# Patient Record
Sex: MALE
[De-identification: names, ages, dates, MRNs, and addresses within clinical notes are randomized per-mention and may not be internally consistent; named-entity substitution may affect disease eponyms.]

---

## 2017-04-06 ENCOUNTER — HOSPITAL ENCOUNTER (OUTPATIENT)
Dept: HOSPITAL 14 - H.ER | Age: 72
Setting detail: OBSERVATION
LOS: 1 days | End: 2017-04-07
Attending: FAMILY MEDICINE | Admitting: FAMILY MEDICINE
Payer: MEDICARE

## 2017-04-06 DIAGNOSIS — E11.65: Primary | ICD-10-CM

## 2017-04-06 DIAGNOSIS — I10: ICD-10-CM

## 2017-04-06 DIAGNOSIS — E86.0: ICD-10-CM

## 2017-04-06 DIAGNOSIS — F17.210: ICD-10-CM

## 2017-04-06 DIAGNOSIS — Z23: ICD-10-CM

## 2017-04-06 DIAGNOSIS — R53.1: ICD-10-CM

## 2017-04-06 DIAGNOSIS — Z85.038: ICD-10-CM

## 2017-04-06 LAB
ALBUMIN/GLOB SERPL: 1.1 {RATIO} (ref 1–2.1)
ALP SERPL-CCNC: 105 U/L (ref 38–126)
ALT SERPL-CCNC: 42 U/L (ref 21–72)
APTT BLD: 22.3 SECONDS (ref 23.3–32.5)
ARTERIAL PATENCY WRIST A: YES
AST SERPL-CCNC: 38 U/L (ref 17–59)
BASOPHILS # BLD AUTO: 0.1 K/UL (ref 0–0.2)
BASOPHILS NFR BLD: 0.9 % (ref 0–2)
BILIRUB SERPL-MCNC: 0.7 MG/DL (ref 0.2–1.3)
BUN SERPL-MCNC: 28 MG/DL (ref 9–20)
CALCIUM SERPL-MCNC: 10.2 MG/DL (ref 8.4–10.2)
CHLORIDE SERPL-SCNC: 89 MMOL/L (ref 98–107)
CO2 SERPL-SCNC: 31 MMOL/L (ref 22–30)
EOSINOPHIL # BLD AUTO: 0.2 K/UL (ref 0–0.7)
EOSINOPHIL NFR BLD: 2.5 % (ref 0–4)
ERYTHROCYTE [DISTWIDTH] IN BLOOD BY AUTOMATED COUNT: 13.7 % (ref 11.5–14.5)
ETHANOL SERPL-MCNC: < 10 MG/DL (ref 0–10)
GLOBULIN SER-MCNC: 3.7 GM/DL (ref 2.2–3.9)
GLUCOSE SERPL-MCNC: 403 MG/DL (ref 75–110)
HCO3 BLDA-SCNC: 30.7 MMOL/L (ref 21–28)
HCT VFR BLD CALC: 44.3 % (ref 35–51)
LYMPHOCYTES # BLD AUTO: 1.8 K/UL (ref 1–4.3)
LYMPHOCYTES NFR BLD AUTO: 19.3 % (ref 20–40)
MAGNESIUM SERPL-MCNC: 2.1 MG/DL (ref 1.6–2.3)
MCH RBC QN AUTO: 30.2 PG (ref 27–31)
MCHC RBC AUTO-ENTMCNC: 34.2 G/DL (ref 33–37)
MCV RBC AUTO: 88.5 FL (ref 80–94)
MONOCYTES # BLD: 0.9 K/UL (ref 0–0.8)
MONOCYTES NFR BLD: 9.7 % (ref 0–10)
NEUTROPHILS # BLD: 6.4 K/UL (ref 1.8–7)
NEUTROPHILS NFR BLD AUTO: 67.6 % (ref 50–75)
NRBC BLD AUTO-RTO: 0 % (ref 0–0)
PH BLDA: 7.48 [PH] (ref 7.35–7.45)
PHOSPHATE SERPL-MCNC: 4.2 MG/DL (ref 2.5–4.5)
PLATELET # BLD: 271 K/UL (ref 130–400)
PMV BLD AUTO: 8.9 FL (ref 7.2–11.7)
PO2 BLDA: 62 MM/HG (ref 80–100)
POTASSIUM SERPL-SCNC: 5.1 MMOL/L (ref 3.6–5)
PROT SERPL-MCNC: 7.8 G/DL (ref 6.3–8.2)
SODIUM SERPL-SCNC: 134 MMOL/L (ref 132–148)
TSH SERPL-ACNC: 1.95 MIU/ML (ref 0.46–4.68)
WBC # BLD AUTO: 9.5 K/UL (ref 4.8–10.8)

## 2017-04-06 PROCEDURE — 85610 PROTHROMBIN TIME: CPT

## 2017-04-06 PROCEDURE — 96360 HYDRATION IV INFUSION INIT: CPT

## 2017-04-06 PROCEDURE — 82948 REAGENT STRIP/BLOOD GLUCOSE: CPT

## 2017-04-06 PROCEDURE — 82803 BLOOD GASES ANY COMBINATION: CPT

## 2017-04-06 PROCEDURE — 80053 COMPREHEN METABOLIC PANEL: CPT

## 2017-04-06 PROCEDURE — 93005 ELECTROCARDIOGRAM TRACING: CPT

## 2017-04-06 PROCEDURE — 85025 COMPLETE CBC W/AUTO DIFF WBC: CPT

## 2017-04-06 PROCEDURE — 87086 URINE CULTURE/COLONY COUNT: CPT

## 2017-04-06 PROCEDURE — 96372 THER/PROPH/DIAG INJ SC/IM: CPT

## 2017-04-06 PROCEDURE — 81003 URINALYSIS AUTO W/O SCOPE: CPT

## 2017-04-06 PROCEDURE — 87804 INFLUENZA ASSAY W/OPTIC: CPT

## 2017-04-06 PROCEDURE — 70450 CT HEAD/BRAIN W/O DYE: CPT

## 2017-04-06 PROCEDURE — 85730 THROMBOPLASTIN TIME PARTIAL: CPT

## 2017-04-06 PROCEDURE — 71010: CPT

## 2017-04-06 PROCEDURE — 99283 EMERGENCY DEPT VISIT LOW MDM: CPT

## 2017-04-06 PROCEDURE — 84443 ASSAY THYROID STIM HORMONE: CPT

## 2017-04-06 PROCEDURE — 84100 ASSAY OF PHOSPHORUS: CPT

## 2017-04-06 PROCEDURE — 83735 ASSAY OF MAGNESIUM: CPT

## 2017-04-06 PROCEDURE — 90732 PPSV23 VACC 2 YRS+ SUBQ/IM: CPT

## 2017-04-06 PROCEDURE — 87040 BLOOD CULTURE FOR BACTERIA: CPT

## 2017-04-06 PROCEDURE — 36415 COLL VENOUS BLD VENIPUNCTURE: CPT

## 2017-04-06 PROCEDURE — 84484 ASSAY OF TROPONIN QUANT: CPT

## 2017-04-06 PROCEDURE — 82140 ASSAY OF AMMONIA: CPT

## 2017-04-06 NOTE — ED PDOC
Hyperglycemia/Hypoglycemia


Time Seen by Provider: 17 21:03


Chief Complaint (Nursing): Weakness/Neurological Deficit


Chief Complaint (Provider): Low Blood Sugar


History Per: Patient


History/Exam Limitations: no limitations


Onset/Duration Of Symptoms: Hrs (x2.5)


Current Symptoms Are (Timing): Still Present


Severity: Moderate


Causative (Exacerbating) Factor(s): Ate Less Than Normal (decreased appetite x3 

days)


Associated Infectious Symptoms: denies: Cough, Nausea, Diarrhea, Other (no 

chills, chest pain, shortness of breath, abdominal pain, constipation, leg 

swelling)


***: The patient does not have any of the infectious symptoms listed except for 

those marked.


Treatment Prior To Provider Evaluation: None


Additional Complaint(s): 


Nolberto Ríos is a 72 year old male, with a past medical history inclusive 

of HTN (does not take Lisinopril QD, only when blood pressure is high) and type 

II diabetes (medication compliant), who presents to the ED on 17 for the 

evaluation of moderate, generalized weakness that he has experienced x2.5 hours

, onset of which was as he was walking around his home. Patient admits to 

having been unable to check his blood sugar prior to arrival as his glucometer 

is in need of batteries, though he does report having had no appetite and very 

little PO intake over the past 3 days. Denies chills, cough, chest pain, 

shortness of breath, abdominal pain, nausea, diarrhea, constipation, leg pain/

swelling, recent travel, recent new foods/drinks or prior history of similar 

appetite loss.





Of note, patient reports having underwent a colonoscopy w/malignant polyp 

removal 2 months ago, though denies having undergone any chemotherapy or 

radiation treatments.





PMD: Dr. Kimberley Madsen ([place])





Past Medical History


Reviewed: Historical Data, Nursing Documentation, Vital Signs


Vital Signs: 


 Last Vital Signs











Temp  98 F   17 20:39


 


Pulse  89   17 20:39


 


Resp  16   17 20:39


 


BP  113/62   17 20:39


 


Pulse Ox  96   17 20:39














- Medical History


PMH: Diabetes (type II), HTN, Malignancy (colon, s/p polypectomy but no 

radiation/chemotherapy)





- Surgical History


Surgical History: Endoscopy


Other surgeries: colonoscopy w/polypectomy, b/l ear surgery (unspecified)





- Family History


Family History: States: Unknown Family Hx





- Living Arrangements


Living Arrangements: With Family (wife)





- Social History


Current smoker - smoking cessation education provided: Yes (1/2 ppd)


Alcohol: None


Drugs: Denies





- Home Medications


Home Medications: 


 Ambulatory Orders











 Medication  Instructions  Recorded


 


Insulin Lispro [humALOG] 20 unit SC TID 17


 


Lisinopril [Zestril] 20 mg PO DAILY 17














- Allergies


Allergies/Adverse Reactions: 


 Allergies











Allergy/AdvReac Type Severity Reaction Status Date / Time


 


No Known Allergies Allergy   Verified 17 20:38














Review of Systems


ROS Statement: Except As Marked, All Systems Reviewed And Found Negative


Constitutional: Positive for: Weakness (generalized).  Negative for: Fever, 

Chills


Cardiovascular: Negative for: Chest Pain, Edema


Respiratory: Negative for: Cough, Shortness of Breath


Gastrointestinal: Positive for: Other (decreased appetite/PO intake).  Negative 

for: Nausea, Vomiting, Abdominal Pain, Diarrhea, Constipation





Physical Exam





- Reviewed


Nursing Documentation Reviewed: Yes


Vital Signs Reviewed: Yes





- Physical Exam


Appears: Positive for: Non-toxic, No Acute Distress (tired appearing)


Head Exam: Positive for: ATRAUMATIC, NORMOCEPHALIC


Skin: Positive for: Normal Color, Warm, Dry


Eye Exam: Positive for: Normal appearance, PERRL


ENT: Positive for: Other (dry mucous membranes).  Negative for: Pharyngeal 

Erythema, Tonsillar Exudate, Tonsillar Swelling


Neck: Positive for: Normal, Painless ROM, Supple


Cardiovascular/Chest: Positive for: Regular Rate, Rhythm.  Negative for: Murmur


Respiratory: Positive for: Rhonchi (faint b/l).  Negative for: Respiratory 

Distress


Gastrointestinal/Abdominal: Positive for: Normal Exam, Soft.  Negative for: 

Tenderness


Back: Positive for: Normal Inspection


Extremity: Positive for: Normal ROM.  Negative for: Swelling


Neurologic/Psych: Positive for: Alert (with subtle delay in answering questions)

, Oriented.  Negative for: Motor/Sensory Deficits, Aphasia, Facial Droop





- Laboratory Results


Result Diagrams: 


 17 07:00





 17 06:25





- ECG


ECG: Positive for: Interpreted By Me, Viewed By Me


ECG Rhythm: Positive for: Sinus Rhythm, Nonspecific Changes (nonspecific ST 

segment abnormality)


Rate: 79


O2 Sat by Pulse Oximetry: 96 (RA)


Pulse Ox Interpretation: Normal





Medical Decision Making


Medical Decision Makin:03


Initial Impression: generalized weakness





Differential diagnoses include but are not limited to dehydration, hyperglycemia

, DKA, electrolyte abnormality.





Initial Plan:


* EKG


* CT Head w/o contrast


* CXR


* ABG Shock Panel


* Labs


* Alcohol Serum


* Ammonia


* Magnesium


* Phosphorus


* TSH


* Troponin I


* PTT


* PT


* Glucose/Blood/POC


* Udip


* Urinalysis


* Urine Drug Screen


* Influenza A B


* Blood Culture


* Urine Culture


* IV NS 1000ml at 1000mls/hr


* Reevaluation





Initial accucheck is within the 300's.





21:47


ABG shows blood glucose level of 403, a blood pH of 7.48 and a lactate of 1.5. 

Patient does not meet SIRS criterion.





EKG shows NSR at 79bpm with nonspecific ST segment abnormalities.





11p Labs demonstrate elevated glucose, anion gap, and BUN. Udip demonstrated 

ketones.


Pt needs hospitalization for uncontrolled diabetes, dehydration, ketosis 

without acidosis.


--------------------------------------------------------------------------------

----------------- 


Scribe Attestation:


Documented by Marychuy Brothers, acting as a scribe for Kelly Irizarry MD.





Provider Scribe Attestation:


All medical record entries made by the Scribe were at my direction and 

personally dictated by me. I have reviewed the chart and agree that the record 

accurately reflects my personal performance of the history, physical exam, 

medical decision making, and the department course for this patient. I have 

also personally directed, reviewed, and agree with the discharge instructions 

and disposition.





Disposition





- Clinical Impression


Clinical Impression: 


 Type 2 diabetes mellitus with hyperglycemia, Generalized muscle weakness


Counseled Patient/Family Regarding: Studies Performed, Diagnosis





- Disposition


Disposition Time: 22:00


Condition: SERIOUS





- Pt Status Changed To:


Hospital Disposition Of: Observation





- POA


Present On Arrival: Poor Glycemic Control

## 2017-04-06 NOTE — CT
EXAM:

  CT Head Without Intravenous Contrast



CLINICAL HISTORY:

  72 years old, male; Signs and symptoms; Other: Weakness; Patient HX: HX of 

S/P polypectomy malignant. HTN. Diabetes; Additional info: Weakness. Sent teetee Parnell. Doc. With request



TECHNIQUE:

  Axial computed tomography images of the head/brain without intravenous 

contrast.  This CT exam was performed using one or more of the following dose 

reduction techniques:  automated exposure control, adjustment of the mA and/or 

kV according to patient size, and/or use of iterative reconstruction technique.

  Coronal and sagittal reformatted images were created and reviewed.



EXAM DATE/TIME:

  4/6/2017 9:15 PM



COMPARISON:

  No relevant prior studies available.



FINDINGS:



  Brain:  There is chronic small vessel ischemic disease. There is atrophy.  

There is no hemorrhage or edema. There are small areas of low attenuation in 

the thalami consistent with old lacunar infarcts.   

  Ventricles:  Unremarkable.  No ventriculomegaly.

  Bones/joints:  The osseous structures are normal.  No acute fracture.

  Soft tissues:  Unremarkable.

  Sinuses:  Minimal mucosal thickening in the ethmoid sinuses.

  Mastoid air cells:  Mastoid air cells are clear.

  



IMPRESSION:    

 

  No acute findings.

## 2017-04-07 VITALS — TEMPERATURE: 97.9 F | RESPIRATION RATE: 20 BRPM | SYSTOLIC BLOOD PRESSURE: 150 MMHG | DIASTOLIC BLOOD PRESSURE: 83 MMHG

## 2017-04-07 VITALS — HEART RATE: 79 BPM | OXYGEN SATURATION: 96 %

## 2017-04-07 LAB
ALBUMIN/GLOB SERPL: 1 {RATIO} (ref 1–2.1)
ALP SERPL-CCNC: 83 U/L (ref 38–126)
ALT SERPL-CCNC: 43 U/L (ref 21–72)
AST SERPL-CCNC: 38 U/L (ref 17–59)
BASOPHILS # BLD AUTO: 0.1 K/UL (ref 0–0.2)
BASOPHILS NFR BLD: 0.7 % (ref 0–2)
BILIRUB SERPL-MCNC: 0.6 MG/DL (ref 0.2–1.3)
BILIRUB UR-MCNC: NEGATIVE MG/DL
BUN SERPL-MCNC: 22 MG/DL (ref 9–20)
CALCIUM SERPL-MCNC: 8.6 MG/DL (ref 8.4–10.2)
CHLORIDE SERPL-SCNC: 99 MMOL/L (ref 98–107)
CO2 SERPL-SCNC: 31 MMOL/L (ref 22–30)
COLOR UR: (no result)
EOSINOPHIL # BLD AUTO: 0.3 K/UL (ref 0–0.7)
EOSINOPHIL NFR BLD: 4.7 % (ref 0–4)
ERYTHROCYTE [DISTWIDTH] IN BLOOD BY AUTOMATED COUNT: 13.8 % (ref 11.5–14.5)
GLOBULIN SER-MCNC: 3.1 GM/DL (ref 2.2–3.9)
GLUCOSE SERPL-MCNC: 269 MG/DL (ref 75–110)
GLUCOSE UR STRIP-MCNC: >=500 MG/DL
HCT VFR BLD CALC: 39.9 % (ref 35–51)
KETONES UR STRIP-MCNC: (no result) MG/DL
LEUKOCYTE ESTERASE UR-ACNC: (no result) LEU/UL
LYMPHOCYTES # BLD AUTO: 2.4 K/UL (ref 1–4.3)
LYMPHOCYTES NFR BLD AUTO: 32.1 % (ref 20–40)
MCH RBC QN AUTO: 30.1 PG (ref 27–31)
MCHC RBC AUTO-ENTMCNC: 33.7 G/DL (ref 33–37)
MCV RBC AUTO: 89.4 FL (ref 80–94)
MONOCYTES # BLD: 0.7 K/UL (ref 0–0.8)
MONOCYTES NFR BLD: 9.3 % (ref 0–10)
NEUTROPHILS # BLD: 3.9 K/UL (ref 1.8–7)
NEUTROPHILS NFR BLD AUTO: 53.2 % (ref 50–75)
NRBC BLD AUTO-RTO: 0.1 % (ref 0–0)
PH UR STRIP: 5 [PH] (ref 5–8)
PLATELET # BLD: 240 K/UL (ref 130–400)
PMV BLD AUTO: 9.4 FL (ref 7.2–11.7)
POTASSIUM SERPL-SCNC: 4.3 MMOL/L (ref 3.6–5)
PROT SERPL-MCNC: 6.1 G/DL (ref 6.3–8.2)
PROT UR STRIP-MCNC: 100 MG/DL
RBC # UR STRIP: NEGATIVE /UL
RBC #/AREA URNS HPF: 3 /HPF (ref 0–3)
SODIUM SERPL-SCNC: 138 MMOL/L (ref 132–148)
SP GR UR STRIP: 1.03 (ref 1–1.03)
UROBILINOGEN UR-MCNC: (no result) MG/DL (ref 0.2–1)
WBC # BLD AUTO: 7.3 K/UL (ref 4.8–10.8)
WBC #/AREA URNS HPF: 4 /HPF (ref 0–5)

## 2017-04-07 RX ADMIN — INSULIN LISPRO SCH U: 100 INJECTION, SOLUTION INTRAVENOUS; SUBCUTANEOUS at 09:03

## 2017-04-07 RX ADMIN — INSULIN LISPRO SCH U: 100 INJECTION, SOLUTION INTRAVENOUS; SUBCUTANEOUS at 12:25

## 2017-04-07 RX ADMIN — INSULIN LISPRO SCH U: 100 INJECTION, SOLUTION INTRAVENOUS; SUBCUTANEOUS at 12:24

## 2017-04-07 RX ADMIN — INSULIN LISPRO SCH: 100 INJECTION, SOLUTION INTRAVENOUS; SUBCUTANEOUS at 07:34

## 2017-04-07 NOTE — CP.PCM.DIS
Provider





- Provider


Date of Admission: 


04/06/17 23:28





Attending physician: 


Floresita Falcon MD





Primary care physician: 


Kimberley Madsen MD





Time Spent in preparation of Discharge (in minutes): 15





Hospital Course





- Lab Results


Lab Results: 


 Most Recent Lab Values











WBC  7.3 K/uL (4.8-10.8)   04/07/17  07:00    


 


RBC  4.47 Mil/uL (4.40-5.90)   04/07/17  07:00    


 


Hgb  13.4 g/dL (12.0-18.0)   04/07/17  07:00    


 


Hct  39.9 % (35.0-51.0)   04/07/17  07:00    


 


MCV  89.4 fl (80.0-94.0)   04/07/17  07:00    


 


MCH  30.1 pg (27.0-31.0)   04/07/17  07:00    


 


MCHC  33.7 g/dL (33.0-37.0)   04/07/17  07:00    


 


RDW  13.8 % (11.5-14.5)   04/07/17  07:00    


 


Plt Count  240 K/uL (130-400)   04/07/17  07:00    


 


MPV  9.4 fl (7.2-11.7)   04/07/17  07:00    


 


Neut % (Auto)  53.2 % (50.0-75.0)   04/07/17  07:00    


 


Lymph % (Auto)  32.1 % (20.0-40.0)   04/07/17  07:00    


 


Mono % (Auto)  9.3 % (0.0-10.0)   04/07/17  07:00    


 


Eos % (Auto)  4.7 % (0.0-4.0)  H  04/07/17  07:00    


 


Baso % (Auto)  0.7 % (0.0-2.0)   04/07/17  07:00    


 


Neut #  3.9 K/uL (1.8-7.0)   04/07/17  07:00    


 


Lymph #  2.4 K/uL (1.0-4.3)   04/07/17  07:00    


 


Mono #  0.7 K/uL (0.0-0.8)   04/07/17  07:00    


 


Eos #  0.3 K/uL (0.0-0.7)   04/07/17  07:00    


 


Baso #  0.1 K/uL (0.0-0.2)   04/07/17  07:00    


 


PT  9.6 SECONDS (9.6-11.2)   04/06/17  21:47    


 


INR  0.92  (0.92-1.08)   04/06/17  21:47    


 


APTT  22.3 SECONDS (23.3-32.5)  L  04/06/17  21:47    


 


pCO2  43 mm/Hg (35-45)   04/06/17  21:40    


 


pO2  62 mm/Hg ()  L  04/06/17  21:40    


 


HCO3  30.7 mmol/L (21-28)  H  04/06/17  21:40    


 


ABG pH  7.48  (7.35-7.45)  H  04/06/17  21:40    


 


ABG Total CO2  33.3 mmol/L (22-28)  H  04/06/17  21:40    


 


ABG O2 Saturation  96.8 % (95-98)   04/06/17  21:40    


 


ABG Base Excess  7.6 mmol/L (-2.0-3.0)  H  04/06/17  21:40    


 


Isra Test  Yes   04/06/17  21:40    


 


ABG Potassium  5.0 mmol/L (3.6-5.2)   04/06/17  21:40    


 


A-a O2 Difference  34.0 mm/Hg  04/06/17  21:40    


 


Sodium  127.0 mmol/L (132-148)  L  04/06/17  21:40    


 


Chloride  94.0 mmol/L ()  L  04/06/17  21:40    


 


Glucose  403 mg/dL ()  H*  04/06/17  21:40    


 


Lactate  1.5 mmol/L (0.7-2.1)   04/06/17  21:40    


 


FiO2  21.0 %  04/06/17  21:40    


 


Crit Value Called To  Dr stephanie hartley   04/06/17  21:40    


 


Crit Value Called By  Rt   04/06/17  21:40    


 


Crit Value Read Back  Y   04/06/17  21:40    


 


Blood Gas Notified Time  2145 04/06/17  21:40    


 


Sodium  138 mmol/l (132-148)   04/07/17  06:25    


 


Potassium  4.3 MMOL/L (3.6-5.0)   04/07/17  06:25    


 


Chloride  99 mmol/L ()   04/07/17  06:25    


 


Carbon Dioxide  31 mmol/L (22-30)  H  04/07/17  06:25    


 


Anion Gap  12  (10-20)   04/07/17  06:25    


 


BUN  22 mg/dl (9-20)  H  04/07/17  06:25    


 


Creatinine  1.0 mg/dL (0.8-1.5)   04/07/17  06:25    


 


Est GFR ( Amer)  > 60   04/07/17  06:25    


 


Est GFR (Non-Af Amer)  > 60   04/07/17  06:25    


 


POC Glucose (mg/dL)  373 mg/dL ()  H  04/07/17  10:59    


 


Random Glucose  269 mg/dL ()  H  04/07/17  06:25    


 


Calcium  8.6 mg/dL (8.4-10.2)   04/07/17  06:25    


 


Phosphorus  4.2 mg/dl (2.5-4.5)   04/06/17  21:47    


 


Magnesium  2.1 MG/DL (1.6-2.3)   04/06/17  21:47    


 


Total Bilirubin  0.6 mg/dl (0.2-1.3)   04/07/17  06:25    


 


AST  38 U/L (17-59)   04/07/17  06:25    


 


ALT  43 U/L (21-72)   04/07/17  06:25    


 


Alkaline Phosphatase  83 U/L ()   04/07/17  06:25    


 


Ammonia  < 9 umo/L (16-60)  L  04/06/17  21:44    


 


Troponin I  0.0120 ng/mL (0.00-0.120)   04/06/17  21:47    


 


Total Protein  6.1 G/DL (6.3-8.2)  L  04/07/17  06:25    


 


Albumin  3.1 g/dL (3.5-5.0)  L D 04/07/17  06:25    


 


Globulin  3.1 gm/dL (2.2-3.9)   04/07/17  06:25    


 


Albumin/Globulin Ratio  1.0  (1.0-2.1)   04/07/17  06:25    


 


TSH 3rd Generation  1.95 mIU/ML (0.46-4.68)   04/06/17  21:47    


 


Arterial Blood Potassium  5.0 mmol/L (3.6-5.2)   04/06/17  21:40    


 


Urine Color  Corie  (YELLOW)   04/06/17  23:53    


 


Urine Clarity  Slighty-cloudy  (Clear)   04/06/17  23:53    


 


Urine pH  5.0  (5.0-8.0)   04/06/17  23:53    


 


Ur Specific Gravity  1.027  (1.003-1.030)   04/06/17  23:53    


 


Urine Protein  100 mg/dL (NEGATIVE)   04/06/17  23:53    


 


Urine Glucose (UA)  >=500 mg/dL (Normal)   04/06/17  23:53    


 


Urine Ketones  Trace mg/dL (NEGATIVE)   04/06/17  23:53    


 


Urine Blood  Negative  (NEGATIVE)   04/06/17  23:53    


 


Urine Nitrate  Negative  (NEGATIVE)   04/06/17  23:53    


 


Urine Bilirubin  Negative  (NEGATIVE)   04/06/17  23:53    


 


Urine Urobilinogen  0.2-1.0 mg/dL (0.2-1.0)   04/06/17  23:53    


 


Ur Leukocyte Esterase  Neg Aylin/uL (Negative)   04/06/17  23:53    


 


Urine RBC (Auto)  3 /hpf (0-3)   04/06/17  23:53    


 


Urine Microscopic WBC  4 /hpf (0-5)   04/06/17  23:53    


 


Hyaline Casts  2-4 /hpf (0-2)  H  04/06/17  23:53    


 


Urine Opiates Screen  Negative  (NEGATIVE)   04/06/17  23:53    


 


Urine Methadone Screen  Negative  (NEGATIVE)   04/06/17  23:53    


 


Ur Barbiturates Screen  Negative  (NEGATIVE)   04/06/17  23:53    


 


Ur Phencyclidine Scrn  Negative  (NEGATIVE)   04/06/17  23:53    


 


Ur Amphetamines Screen  Negative  (NEGATIVE)   04/06/17  23:53    


 


U Benzodiazepines Scrn  Negative  (NEGATIVE)   04/06/17  23:53    


 


U Oth Cocaine Metabols  Negative  (NEGATIVE)   04/06/17  23:53    


 


U Cannabinoids Screen  Negative  (NEGATIVE)   04/06/17  23:53    


 


Alcohol, Quantitative  < 10 mg/dl (0-10)   04/06/17  21:47    


 


Influenza Typ A,B (EIA)  Negative for flu a/b  (NEGATIVE)   04/06/17  21:47    














Discharge Exam





- Head Exam


Head Exam: ATRAUMATIC, NORMOCEPHALIC





Discharge Plan





- Follow Up Plan


Condition: STABLE


Disposition: HOME/ ROUTINE


Instructions:  Diabetes Mellitus Type 2 in Adults (DC), Meal Planning with 

Diabetes Exchanges (DC), Diabetic Hyperglycemia (DC)


Additional Instructions: 


f/u rmg in am, rted prn, meds per med rec


final dx-hyperglycemia in dm2


pt instructed to take all meds and test glucose as directed


no further comlaints bw normalizing


dm diet


Referrals: 


Kimberley Madsen MD [Primary Care Provider] -

## 2017-04-07 NOTE — RAD
HISTORY:

weakness  



COMPARISON:

No prior. 



FINDINGS:



LUNGS:

The lungs are well inflated and clear.



PLEURA:

No significant pleural effusion identified, no pneumothorax apparent.



CARDIOVASCULAR:

Normal.



OSSEOUS STRUCTURES:

No significant abnormalities.



VISUALIZED UPPER ABDOMEN:

Normal.



OTHER FINDINGS:

None.



IMPRESSION:

No active pulmonary disease.

## 2017-04-07 NOTE — CARD
--------------- APPROVED REPORT --------------





EKG Measurement

Heart [id number]

HI [id number]

[id number]

[id number]

[id number]



<Conclusion>

Sinus rhythm with premature atrial complexes

Left axis deviation

Possible inferior infarct, age undetermined

Abnormal ECG

## 2017-04-07 NOTE — CP.PCM.HP
History of Present Illness





- History of Present Illness


History of Present Illness: 


pt admitted for weakness r/t hyperglycemia. states has not been taking meds-

humalog and not testing glucose. also has not been following dm2 diet. at 

present is feeling better and not feelingweak. no f/c, n/v/d. all bw and 

imaging reviewed. 





Present on Admission





- Present on Admission


Any Indicators Present on Admission: Yes


History of Uncontrolled Diabetes: Yes





Review of Systems





- Neurological


Neurological: As Per HPI, Weakness





Past Patient History





- Past Medical History & Family History


Past Medical History?: Yes





- Past Social History


Smoking Status: Heavy Smoker > 10 Cigarettes Daily





- CARDIAC


Hx Cardiac Disorders: No





- PULMONARY


Hx Respiratory Disorders: No





- NEUROLOGICAL


Hx Neurological Disorder: No





- HEENT


Hx HEENT Problems: No





- RENAL


Hx Chronic Kidney Disease: No





- ENDOCRINE/METABOLIC


Hx Endocrine Disorders: Yes


Hx Diabetes Mellitus Type 2: Yes





- HEMATOLOGICAL/ONCOLOGICAL


Hx Blood Disorders: No





- INTEGUMENTARY


Hx Dermatological Problems: No





- MUSCULOSKELETAL/RHEUMATOLOGICAL


Hx Musculoskeletal Disorders: No


Hx Falls: Yes (Last fall 4/6 morning PTA)





- GENITOURINARY/GYNECOLOGICAL


Hx Genitourinary Disorders: Yes


Other/Comment: Malignant Polyp





- PSYCHIATRIC


Hx Psychophysiologic Disorder: No


Hx Substance Use: No





- SURGICAL HISTORY


Hx Surgeries: Yes


Other/Comment: Colonoscopy





- ANESTHESIA


Hx Anesthesia: Yes


Hx Anesthesia Reactions: No


Hx Malignant Hyperthermia: No


Has any member of the family had a problem w/ anesthesia?: No





Meds


Allergies/Adverse Reactions: 


 Allergies











Allergy/AdvReac Type Severity Reaction Status Date / Time


 


No Known Allergies Allergy   Verified 04/06/17 20:38














Physical Exam





- Constitutional


Appears: Well, Non-toxic, No Acute Distress





- Head Exam


Head Exam: ATRAUMATIC, NORMAL INSPECTION, NORMOCEPHALIC





- Eye Exam


Eye Exam: EOMI, Normal appearance, PERRL


Pupil Exam: NORMAL ACCOMODATION, PERRL





- ENT Exam


ENT Exam: Mucous Membranes Moist, Normal Exam





- Neck Exam


Neck exam: Positive for: Normal Inspection





- Respiratory Exam


Respiratory Exam: Clear to Auscultation Bilateral, NORMAL BREATHING PATTERN





- Cardiovascular Exam


Cardiovascular Exam: REGULAR RHYTHM, RRR, +S1, +S2





- GI/Abdominal Exam


GI & Abdominal Exam: Normal Bowel Sounds, Soft.  absent: Tenderness





- Extremities Exam


Extremities exam: Positive for: full ROM, joint swelling, normal inspection, 

pedal pulses present





- Back Exam


Back exam: NORMAL INSPECTION





- Neurological Exam


Neurological exam: Alert, CN II-XII Intact, Normal Gait, Oriented x3, Reflexes 

Normal





- Psychiatric Exam


Psychiatric exam: Normal Affect, Normal Mood





- Skin


Skin Exam: Dry, Intact, Normal Color, Warm





Results





- Vital Signs


Recent Vital Signs: 





 Last Vital Signs











Temp  97.7 F   04/07/17 00:50


 


Pulse  81   04/07/17 00:50


 


Resp  16   04/07/17 00:50


 


BP  130/86   04/07/17 00:50


 


Pulse Ox  95   04/07/17 00:50














- Labs


Result Diagrams: 


 04/07/17 07:00





 04/07/17 06:25


Labs: 





 Laboratory Results - last 24 hr











  04/06/17 04/07/17 04/07/17





  23:53 00:02 06:26


 


POC Glucose (mg/dL)   446 H*  262 H


 


Urine Color  Corie  


 


Urine Clarity  Slighty-cloudy  


 


Urine pH  5.0  


 


Ur Specific Gravity  1.027  


 


Urine Protein  100  


 


Urine Glucose (UA)  >=500  


 


Urine Ketones  Trace  


 


Urine Blood  Negative  


 


Urine Nitrate  Negative  


 


Urine Bilirubin  Negative  


 


Urine Urobilinogen  0.2-1.0  


 


Ur Leukocyte Esterase  Neg  


 


Urine RBC (Auto)  3  


 


Urine Microscopic WBC  4  


 


Hyaline Casts  2-4 H  


 


Urine Opiates Screen  Negative  


 


Urine Methadone Screen  Negative  


 


Ur Barbiturates Screen  Negative  


 


Ur Phencyclidine Scrn  Negative  


 


Ur Amphetamines Screen  Negative  


 


U Benzodiazepines Scrn  Negative  


 


U Oth Cocaine Metabols  Negative  


 


U Cannabinoids Screen  Negative  














Assessment & Plan


(1) Type 2 diabetes mellitus with hyperglycemia


Assessment and Plan: 


home meds, riss, fsbg, dm diet, hydration


Status: Acute





(2) DVT prophylaxis


Assessment and Plan: 


scd nad aehose, ambulation


Status: Acute








Decision To Admit





- Pt Status Changed To:


Hospital Disposition Of: Observation





- .


Bed Request Type: Med/Surg


Admitting Physician: Floresita Falcon

## 2018-01-10 ENCOUNTER — HOSPITAL ENCOUNTER (INPATIENT)
Dept: HOSPITAL 14 - H.ER | Age: 73
LOS: 12 days | Discharge: HOME | DRG: 885 | End: 2018-01-22
Attending: PSYCHIATRY & NEUROLOGY | Admitting: PSYCHIATRY & NEUROLOGY
Payer: MEDICARE

## 2018-01-10 VITALS — OXYGEN SATURATION: 97 %

## 2018-01-10 DIAGNOSIS — I10: ICD-10-CM

## 2018-01-10 DIAGNOSIS — F17.210: ICD-10-CM

## 2018-01-10 DIAGNOSIS — E11.65: ICD-10-CM

## 2018-01-10 DIAGNOSIS — F32.2: Primary | ICD-10-CM

## 2018-01-10 DIAGNOSIS — Z23: ICD-10-CM

## 2018-01-10 DIAGNOSIS — R45.851: ICD-10-CM

## 2018-01-10 LAB
ALBUMIN SERPL-MCNC: 3.6 G/DL (ref 3.5–5)
ALBUMIN/GLOB SERPL: 1.1 {RATIO} (ref 1–2.1)
ALT SERPL-CCNC: 42 U/L (ref 21–72)
AST SERPL-CCNC: 31 U/L (ref 17–59)
BUN SERPL-MCNC: 19 MG/DL (ref 9–20)
CALCIUM SERPL-MCNC: 9.4 MG/DL (ref 8.4–10.2)
ERYTHROCYTE [DISTWIDTH] IN BLOOD BY AUTOMATED COUNT: 14.4 % (ref 11.5–14.5)
FERRITIN SERPL-MCNC: 172 NG/ML (ref 17.9–464)
FOLATE SERPL-MCNC: 10.3 NG/ML
GFR NON-AFRICAN AMERICAN: > 60
HDLC SERPL-MCNC: 34 MG/DL (ref 30–70)
HGB BLD-MCNC: 13.6 G/DL (ref 12–18)
LDLC SERPL-MCNC: 101 MG/DL (ref 0–129)
MCH RBC QN AUTO: 29.9 PG (ref 27–31)
MCHC RBC AUTO-ENTMCNC: 33 G/DL (ref 33–37)
MCV RBC AUTO: 90.5 FL (ref 80–94)
PLATELET # BLD: 242 K/UL (ref 130–400)
RBC # BLD AUTO: 4.55 MIL/UL (ref 4.4–5.9)
T4 SERPL-MCNC: 12.9 UG/DL (ref 5.5–11)
VIT B12 SERPL-MCNC: 340 PG/ML (ref 239–931)
WBC # BLD AUTO: 6.7 K/UL (ref 4.8–10.8)

## 2018-01-10 PROCEDURE — GZHZZZZ GROUP PSYCHOTHERAPY: ICD-10-PCS | Performed by: PSYCHIATRY & NEUROLOGY

## 2018-01-10 PROCEDURE — 3E0234Z INTRODUCTION OF SERUM, TOXOID AND VACCINE INTO MUSCLE, PERCUTANEOUS APPROACH: ICD-10-PCS | Performed by: PSYCHIATRY & NEUROLOGY

## 2018-01-10 PROCEDURE — GZ56ZZZ INDIVIDUAL PSYCHOTHERAPY, SUPPORTIVE: ICD-10-PCS | Performed by: PSYCHIATRY & NEUROLOGY

## 2018-01-10 RX ADMIN — INSULIN DETEMIR SCH UNITS: 100 INJECTION, SOLUTION SUBCUTANEOUS at 21:17

## 2018-01-10 RX ADMIN — INSULIN LISPRO SCH U: 100 INJECTION, SOLUTION INTRAVENOUS; SUBCUTANEOUS at 12:00

## 2018-01-10 RX ADMIN — INSULIN LISPRO SCH: 100 INJECTION, SOLUTION INTRAVENOUS; SUBCUTANEOUS at 16:42

## 2018-01-10 RX ADMIN — INSULIN LISPRO SCH U: 100 INJECTION, SOLUTION INTRAVENOUS; SUBCUTANEOUS at 11:58

## 2018-01-10 RX ADMIN — INSULIN LISPRO SCH: 100 INJECTION, SOLUTION INTRAVENOUS; SUBCUTANEOUS at 16:43

## 2018-01-10 RX ADMIN — INSULIN LISPRO SCH: 100 INJECTION, SOLUTION INTRAVENOUS; SUBCUTANEOUS at 21:16

## 2018-01-10 RX ADMIN — INSULIN LISPRO SCH: 100 INJECTION, SOLUTION INTRAVENOUS; SUBCUTANEOUS at 12:01

## 2018-01-10 NOTE — ED PDOC
Psych Transfer Clearance





- Clearance Statement


Clearance Statement: 


Reviewed vital signs, lab results and transfer papers.  Patient clinically 

stable for psychiatric admission.

## 2018-01-10 NOTE — CP.PCM.HP
History of Present Illness





- History of Present Illness


History of Present Illness: 





pt admitted for depression/SI after wife  2 months ago.  pt is crying at 

present. no f/c, n/v/d. h/o dm2, htn. med rec completed.


per RN there is speculation that pt was using cocaine around time of wifes 

death.


1:1 at bedside.


denies current substance abuse





Present on Admission





- Present on Admission


Any Indicators Present on Admission: Yes


History of Uncontrolled Diabetes: Yes





Review of Systems





- Psychiatric


Psychiatric: As Per HPI, Depression





Past Patient History





- Past Medical History & Family History


Past Medical History?: Yes





- Past Social History


Smoking Status: Heavy Smoker > 10 Cigarettes Daily





- CARDIAC


Hx Hypertension: Yes





- PULMONARY


Hx Respiratory Disorders: No





- NEUROLOGICAL


Hx Neurological Disorder: No





- HEENT


Hx HEENT Problems: No





- RENAL


Hx Chronic Kidney Disease: No





- ENDOCRINE/METABOLIC


Hx Endocrine Disorders: Yes


Hx Diabetes Mellitus Type 2: Yes





- HEMATOLOGICAL/ONCOLOGICAL


Hx Blood Disorders: No





- INTEGUMENTARY


Hx Dermatological Problems: No





- MUSCULOSKELETAL/RHEUMATOLOGICAL


Hx Musculoskeletal Disorders: No


Hx Falls: Yes





- GENITOURINARY/GYNECOLOGICAL


Hx Genitourinary Disorders: Yes


Other/Comment: Malignant Polyp





- PSYCHIATRIC


Hx Anxiety: Yes


Hx Depression: Yes


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





- SURGICAL HISTORY


Hx Surgeries: Yes


Other/Comment: Colonoscopy





- ANESTHESIA


Hx Anesthesia: Yes


Hx Anesthesia Reactions: No


Hx Malignant Hyperthermia: No





Meds


Allergies/Adverse Reactions: 


 Allergies











Allergy/AdvReac Type Severity Reaction Status Date / Time


 


No Known Allergies Allergy   Verified 17 20:38














Physical Exam





- Constitutional


Appears: Well, Non-toxic, No Acute Distress





- Head Exam


Head Exam: ATRAUMATIC, NORMAL INSPECTION, NORMOCEPHALIC





- Eye Exam


Eye Exam: EOMI, Normal appearance, PERRL


Pupil Exam: NORMAL ACCOMODATION, PERRL





- ENT Exam


ENT Exam: Mucous Membranes Moist, Normal Exam





- Neck Exam


Neck exam: Positive for: Normal Inspection





- Respiratory Exam


Respiratory Exam: Clear to Auscultation Bilateral, NORMAL BREATHING PATTERN





- Cardiovascular Exam


Cardiovascular Exam: REGULAR RHYTHM, RRR, +S1, +S2





- GI/Abdominal Exam


GI & Abdominal Exam: Normal Bowel Sounds, Soft.  absent: Tenderness





- Extremities Exam


Extremities exam: Positive for: full ROM, normal capillary refill, normal 

inspection, pedal pulses present





- Back Exam


Back exam: NORMAL INSPECTION





- Neurological Exam


Neurological exam: Alert, CN II-XII Intact, Normal Gait, Oriented x3, Reflexes 

Normal





- Psychiatric Exam


Psychiatric exam: Normal Affect, Normal Mood





- Skin


Skin Exam: Dry, Intact, Normal Color, Warm





Results





- Vital Signs


Recent Vital Signs: 





 Last Vital Signs











Temp  97.6 F   01/10/18 06:24


 


Pulse  77   01/10/18 06:24


 


Resp  20   01/10/18 06:24


 


BP  149/85   01/10/18 06:24


 


Pulse Ox  97   01/10/18 01:40














- Labs


Result Diagrams: 


 01/10/18 06:25





 01/10/18 06:25


Labs: 





 Laboratory Results - last 24 hr











  01/10/18 01/10/18





  06:25 06:25


 


WBC  6.7 


 


RBC  4.55 


 


Hgb  13.6 


 


Hct  41.1 


 


MCV  90.5 


 


MCH  29.9 


 


MCHC  33.0 


 


RDW  14.4 


 


Plt Count  242 


 


Sodium   140


 


Potassium   4.7


 


Chloride   101


 


Carbon Dioxide   33 H


 


Anion Gap   11


 


BUN   19


 


Creatinine   1.1


 


Est GFR ( Amer)   > 60


 


Est GFR (Non-Af Amer)   > 60


 


Random Glucose   212 H


 


Calcium   9.4


 


Total Bilirubin   0.4


 


AST   31


 


ALT   42


 


Alkaline Phosphatase   66


 


Total Protein   6.9


 


Albumin   3.6


 


Globulin   3.3


 


Albumin/Globulin Ratio   1.1


 


Triglycerides   154 H


 


Cholesterol   171


 


LDL Cholesterol Direct   101


 


HDL Cholesterol   34


 


Thyroxine (T4)   12.9 H














Assessment & Plan


(1) HTN (hypertension)


Assessment and Plan: 


cont home meds


Status: Acute   





(2) Depression with suicidal ideation


Assessment and Plan: 


psych meds, interventions, therapies, 1:1


Status: Acute   





(3) DVT prophylaxis


Assessment and Plan: 


ae hose, ambulation


Status: Acute   





(4) Type 2 diabetes mellitus with hyperglycemia


Assessment and Plan: 


fsbg, ss, home meds


diet control


Status: Acute   





Decision To Admit





- Pt Status Changed To:


Hospital Disposition Of: Inpatient





- Admit Certification


Admit to Inpatient:: After my assessment, the patient will require 

hospitalization for at least two midnights.  This is because of the severity of 

symptoms shown, intensity of services needed, and/or the medical risk in this 

patient being treated as an outpatient.





- .


Bed Request Type: [facility]


Admitting Physician: Rosendo Mcdonald

## 2018-01-10 NOTE — PCM.BM
<Bianca Camp - Last Filed: 01/10/18 02:20>





Treatment Plan Problems





- Problems identified on initial assessmt


  ** Feelings of Worthlessness


Date Initiated: 01/10/18


Time Initiated: 02:21


Assessment reference: NA


Status: Active





  ** Hopelessness/Helplessnes


Date Initiated: 01/10/18


Time Initiated: 02:22


Assessment reference: NA


Status: Active





Treatment assets and liabiliti


Patient Assests: cooperative, negotiates basic needs


Patient Liabilities: relationship conflicts, substance abuse





- Milieu Protocol


Maintain good personal hygiene: daily Encourage regular showers, daily Remind 

patient to perform daily oral care, daily Assist patient to perform ADL's


Conduct patient checks and document Observation sheet: 1:1


Maintain personal safety: every shift Educate patient to report safety concerns 

to staff, every shift Monitor environment for contraband/sharps


Medication safety: Monitor for expected outcome, potential side effects: every 

shift, Assess barriers to learning: every shift, Assess readiness for 

medication education: every shift





<Mervat Calzada - Last Filed: 01/11/18 11:41>





- Diagnosis


(1) Major depressive disorder


Status: Acute   


Interventions: 


Individual and group therapy, Medication management, Psychoeducation


01/11/18 11:42











<Lazara Saravia - Last Filed: 01/12/18 14:28>





Family Contact


Family contact: Patient agrees to contact, Family has been contacted by patient

, Telephone contact initiated by staff


Family contact name: Keshawn - son


Family contacted how many times per week?: 2


Family contact comment: [phone].  Please reefr to SW progress note for 

information





- Goals for Treatment


Patient goals for treatment: Pt to be encouraged to attend activity and 

clinical groups 3-5x per week to identify at least 2 contributing factors to 

depression and suicide attempt. Psycho-education to be provided to patient/

family regarding benefits of medications and treatment adherence. Pt to be 

encouraged to participate in group milieu to develop effective coping skills to 

reduce depression and free of suicide ideation. Coordinate discharge resource 

needs by providing referral for psychiatric treatment follow up in the 

community.





Discharge/Continuing Care





- Education Needs


Education Needs: Family Medication, Family Diagnosis/Disease Process, Family 

Coping Skills, Family Placement options, Family Community resources, Family 

Activities of Daily Living, Family Nutrition, Family Health Practices/Safety, 

Family Personal Hygiene/Grooming, Family Aftercare Safety Plan, Patient 

Medication, Patient Diagnosis/Disease Process, Patient Coping Skills, Patient 

Placement options, Patient Community resources, Patient Activities of Daily 

Living, Patient Nutrition, Patient Uses of Medical Equipment, Patient Health 

Practices/Safety, Patient Personal Hygiene/Grooming, Patient Aftercare Safety 

Plan





- Discharge


Discharge Criteria: Tolerates medication w/o severe side effects, Free of 

Suicidal thoughts, Normal sleep pattern, Ability to care for self, Reduction of 

target symptoms


Discharge to:: Home, With Family





- Additional Comments





01/12/18 14:26


Pt seen and discussed in team meeting. Reason for hospitalization reviewed and 

discussed. Pt reported feeling depressed, worthless, helpless, lack of 

initiation and motivation and unable to stay focus. Pt reported feeling 

depressed following his spouse death by suicide. Reportedly, pt's spouse 

overdosed on Xanax in July 2017. Pt reported significant weight loss. Pt's 

social and medical issues reviewed and discussed. Pt's medications reviewed and 

discussed. Tx plan reviewed and pt is agreeable. SW to continue to follow case.

   





- Treatment Team Participation


Discussed with Family/SO: No (Discussed via telephone)


Was Patient/Family/SO present at Treatment Team Meeting: Yes

## 2018-01-10 NOTE — PCM.PSYCH
Initial Psychiatric Evaluation





- Initial Psychiatric Evaluation


Chief Complaint (in patient's own words): 





Was at [facility] 2nd to being brought there for suicidal ideations to 

hang self since wife was reportedly  approx. 4 months ago 2nd "overdose 

pills". pt reports that he no longer wants to live,  for more than 37 

years. denies previous psychiatric treatment but report indicates pt may have 

used cocaine in the past. denies previous suicide attempts. denies previous 

inpt psychiatric treatment. reports smokes cigarettes 10 per day.     


Patient's Reaction to Hospitalization: 





pt signed voluntarily to be transferred to [facility] from [facility]. 


History of Present Illness and Precipitating Events: 





see above


Current Medications: 





Active Medications











Generic Name Dose Route Start Last Admin





  Trade Name Freq  PRN Reason Stop Dose Admin


 


Acetaminophen  650 mg  01/10/18 02:28  





  Tylenol 325mg Tab  PO   





  Q4 PRN   





  Pain, moderate (4-7)   


 


Al Hydrox/Mg Hydrox/Simethicone  30 ml  01/10/18 02:28  





  Maalox Plus 30 Ml  PO   





  Q4 PRN   





  Dyspepsia   


 


Bismuth Subsalicylate  524 mg  01/10/18 02:28  





  Pepto-Bismol  PO   





  Q4 PRN   





  Diarrhea   


 


Famotidine  20 mg  01/10/18 09:00  01/10/18 21:15





  Pepcid  PO   20 mg





  Q12 SANDRA   Administration


 


Insulin Detemir  20 units  01/10/18 22:00  01/10/18 21:17





  Levemir  SC   20 units





  HS SANDRA   Administration


 


Insulin Human Lispro  0 units  01/10/18 07:30  01/10/18 21:16





  Humalog  SC   Not Given





  ACHS [facility]   





  Protocol   


 


Insulin Human Lispro  30 units  01/10/18 11:30  01/10/18 16:42





  Humalog  SC   Not Given





  TIDAC [facility]   


 


Lisinopril  10 mg  01/10/18 09:00  01/10/18 12:01





  Zestril  PO   10 mg





  DAILY SANDRA   Administration


 


Lorazepam  0.5 mg  01/10/18 02:28  





  Ativan  PO  18 02:29  





  HS PRN   





  Insomnia   


 


Lorazepam  0.5 mg  01/10/18 02:28  01/10/18 08:36





  Ativan  PO  18 02:29  0.5 mg





  Q6 PRN   Administration





  Anixety/Agitation   


 


Magnesium Hydroxide  30 ml  01/10/18 02:28  





  Milk Of Magnesia  PO   





  HS PRN   





  Constipation   














Past Psychiatric History





- Past Psychiatric History


Prior Professional Help: denies


History of ETOH/Drug Use: 





?intranasal cocaine


History of Family Illness: 





denies


Pertinent Medical Hx (Current Medical&Sleep Prob, Allergies): 





 Allergies











Allergy/AdvReac Type Severity Reaction Status Date / Time


 


No Known Allergies Allergy   Verified 17 20:38








 





Insulin Lispro [humALOG] 20 unit SC TID 17 


Lisinopril [Zestril] 10 mg PO DAILY 17 


Famotidine [Pepcid] 20 mg PO Q12 01/10/18 


Insulin Aspart, Recombinant [Novolog] 30 unit SQ TID 01/10/18 


Insulin Glargine, Recombina [Lantus] 20 unit SC DAILY 01/10/18 











Review of Systems





- Psychiatric


Psychiatric: Abnormal Sleep Pattern, Anhedonia, Depression, Suicidal Ideation





Mental Status Examination





- Personal Presentation


Personal Presentation: Looks stated age





- Affect


Affect: Depressed





- Motor Activity


Motor Activity: Psychomotor Retardation





- Speech


Speech: Organized





- Mood


Mood: Depressed





- Formal Thought Process


Formal Thought Process: No Impairment





- Cognitive Functions


Orientation: Person, Place, Situation, Time


Sensorium: Alert


Attention/Concentration: Attentive


Judgement: Imparied, as evidence by: Other





- Risk


Risk: Suicidal





- Strength & Assets Inventory


Strength & Assets Inventory: Cooperative (reported recent death of wife of 37 

years wife ? to overdose pills)





DSM 5 DX





- DSM 5


DSM 5 Diagnosis: 





Major Depressive Disorder 1ST Episode-moderate to severe without psychosis


Grief


Polysubstance use: Nicotine /active; ?cocain











- Recommended/Plan of Treatment


Treatment Recommendations and Plan of Treatment: 





inpt per attending


vital signs and clinical observation per protocol and per clinical status


1:1 for suicide precautions 


hospitalist consult


Remeron 7.5mg po HS-team can reassess any side effects and consider increasing 

to therapeutic dose


nicotine patch 14mg applied topically in am and removed at hs


team to obtain additional collaborative information in am


Discharge planning in progress:?opd psych f/u with psychotherapy with [place] 

speaker providers if possible


Projected ELOS: 5-7 days


Prognosis: guarded


Discharge Plan and Discharge Criteria: 





safety





- Smoking Cessation


Smoking Cessation Initiated: Yes

## 2018-01-11 RX ADMIN — INSULIN LISPRO SCH U: 100 INJECTION, SOLUTION INTRAVENOUS; SUBCUTANEOUS at 17:39

## 2018-01-11 RX ADMIN — INSULIN LISPRO SCH: 100 INJECTION, SOLUTION INTRAVENOUS; SUBCUTANEOUS at 12:50

## 2018-01-11 RX ADMIN — INSULIN LISPRO SCH: 100 INJECTION, SOLUTION INTRAVENOUS; SUBCUTANEOUS at 21:15

## 2018-01-11 RX ADMIN — INSULIN LISPRO SCH U: 100 INJECTION, SOLUTION INTRAVENOUS; SUBCUTANEOUS at 12:49

## 2018-01-11 RX ADMIN — INSULIN DETEMIR SCH UNITS: 100 INJECTION, SOLUTION SUBCUTANEOUS at 21:18

## 2018-01-11 RX ADMIN — INSULIN LISPRO SCH: 100 INJECTION, SOLUTION INTRAVENOUS; SUBCUTANEOUS at 08:23

## 2018-01-11 RX ADMIN — INSULIN LISPRO SCH: 100 INJECTION, SOLUTION INTRAVENOUS; SUBCUTANEOUS at 17:33

## 2018-01-11 RX ADMIN — INSULIN LISPRO SCH U: 100 INJECTION, SOLUTION INTRAVENOUS; SUBCUTANEOUS at 08:23

## 2018-01-11 NOTE — PCM.PYCHPN
Psychiatric Progress Note





- Psychiatric Progress Note


Patient seen today, length of contact: Patient evaluated, case discussed with 

team, chart reviewed


Patient Chief Complaint: 


"I'm depressed."


Problems Identified/Issues Discussed: 


Patient reports that he continues to be severely depressed. He keeps thinking 

about his  wife.  He states that on the day prior to admission, he felt 

as if spirits were trying to pull him.  He denies current AH/VH/paranoia/

delusions.  He was tearful on interview.  He denies adverse effects to 

medications.  We discussed increasing the Remeron.  Patient gave writer and 

clinical staff permission to discuss his treatment with his family.


Medication Change: Yes (Increase Remeron)


Medical Record Reviewed: Yes


Consults ordered or reviewed: 


Medicine consult, Physical Therapy consult, Psychology consult





Mental Status Examination





- Cognitive Function


Orientation: Person, Place, Situation, Time


Memory: Intact


Association: WNL


Fund of Knowledge: [facility]


Decription of patient's judgement and insights: 


Fair I/J





- Mood


Mood: Depressed





- Affect


Affect: Depressed





- Speech


Speech: Appropriate





- Formal Thought Process


Formal Thought Process: No Impairment


Psychotic Thoughts and Behaviors: 


No AH/VH/paranoia/delusions





- Suicidal Ideation


Suicidal Ideation: No





- Homicidal Ideation


Homicidal Ideation: No





Goal/Treatment Plan





- Goal/Treatment Plan


Need for Continued Stay: Remain at risks for inpatient hospitalization, Severe 

depression anxiety, Discharge may exacerbated symptoms, Severe functional 

impairment


Progress Toward Problem(s) and Goals/Treatment Plan: 


Major Depressive Disorder, Severe; patient needs continued hospitalization for 

treatment and safety





-Individual and group therapy


-Increase Remeron to 15 mg PO HS


-Discontinue 1:1; patient can contract for safety at this time


-Medicine consult


-Physical therapy consult


-Disposition planning


-Nicotine patch


Estimated Date of D/C: 18





- Smoking Cessation


Smoking Cessation Initiated: Yes

## 2018-01-12 RX ADMIN — Medication SCH TAB: at 17:21

## 2018-01-12 RX ADMIN — INSULIN LISPRO SCH: 100 INJECTION, SOLUTION INTRAVENOUS; SUBCUTANEOUS at 11:24

## 2018-01-12 RX ADMIN — INSULIN LISPRO SCH: 100 INJECTION, SOLUTION INTRAVENOUS; SUBCUTANEOUS at 21:04

## 2018-01-12 RX ADMIN — INSULIN LISPRO SCH: 100 INJECTION, SOLUTION INTRAVENOUS; SUBCUTANEOUS at 17:19

## 2018-01-12 RX ADMIN — INSULIN LISPRO SCH U: 100 INJECTION, SOLUTION INTRAVENOUS; SUBCUTANEOUS at 12:25

## 2018-01-12 RX ADMIN — INSULIN DETEMIR SCH UNITS: 100 INJECTION, SOLUTION SUBCUTANEOUS at 21:03

## 2018-01-12 RX ADMIN — INSULIN LISPRO SCH UNITS: 100 INJECTION, SOLUTION INTRAVENOUS; SUBCUTANEOUS at 17:20

## 2018-01-12 RX ADMIN — INSULIN LISPRO SCH UNITS: 100 INJECTION, SOLUTION INTRAVENOUS; SUBCUTANEOUS at 12:23

## 2018-01-12 RX ADMIN — INSULIN LISPRO SCH U: 100 INJECTION, SOLUTION INTRAVENOUS; SUBCUTANEOUS at 09:08

## 2018-01-12 NOTE — CP.PCM.CON
History of Present Illness





- History of Present Illness


History of Present Illness: 





Pt is a 73 year old male admitted to the geropsych unit and referred to the 

writer for evaluation.  On the DRS, pt scored an overall score of 109. pt 

scored within normal limits on Attention, Construction and Conceptualization 

tasks.  Pt's Initiation and Memory skills fell in the Deficient Range.





Overall  109


Attention  34


Memory  15


Initiation  20


Construction  4


Conceptualization  34





Deficits evident with patient requiring assistance in the future,


thank you for this referral,


Dr. White





Past Patient History





- Past Medical History & Family History


Past Medical History?: Yes





- Past Social History


Smoking Status: Heavy Smoker > 10 Cigarettes Daily





- CARDIAC


Hx Hypertension: Yes





- PULMONARY


Hx Respiratory Disorders: No





- NEUROLOGICAL


Hx Neurological Disorder: No





- HEENT


Hx HEENT Problems: No





- RENAL


Hx Chronic Kidney Disease: No





- ENDOCRINE/METABOLIC


Hx Endocrine Disorders: Yes


Hx Diabetes Mellitus Type 2: Yes





- HEMATOLOGICAL/ONCOLOGICAL


Hx Blood Disorders: No





- INTEGUMENTARY


Hx Dermatological Problems: No





- MUSCULOSKELETAL/RHEUMATOLOGICAL


Hx Musculoskeletal Disorders: No


Hx Falls: Yes





- GENITOURINARY/GYNECOLOGICAL


Hx Genitourinary Disorders: Yes


Other/Comment: Malignant Polyp





- PSYCHIATRIC


Hx Anxiety: Yes


Hx Depression: Yes


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





- SURGICAL HISTORY


Hx Surgeries: Yes


Other/Comment: Colonoscopy





- ANESTHESIA


Hx Anesthesia: Yes


Hx Anesthesia Reactions: No


Hx Malignant Hyperthermia: No





Meds


Allergies/Adverse Reactions: 


 Allergies











Allergy/AdvReac Type Severity Reaction Status Date / Time


 


No Known Allergies Allergy   Verified 04/06/17 20:38














- Medications


Medications: 


 Current Medications





Acetaminophen (Tylenol 325mg Tab)  650 mg PO Q4 PRN


   PRN Reason: Pain, moderate (4-7)


Al Hydrox/Mg Hydrox/Simethicone (Maalox Plus 30 Ml)  30 ml PO Q4 PRN


   PRN Reason: Dyspepsia


Bismuth Subsalicylate (Pepto-Bismol)  524 mg PO Q4 PRN


   PRN Reason: Diarrhea


Famotidine (Pepcid)  20 mg PO Q12 SANDRA


   Last Admin: 01/12/18 09:11 Dose:  20 mg


Insulin Detemir (Levemir)  20 units SC HS SANDRA


   Last Admin: 01/11/18 21:18 Dose:  20 units


Insulin Human Lispro (Humalog)  0 units SC ACHS SANDRA


   PRN Reason: Protocol


   Last Admin: 01/12/18 12:25 Dose:  3 u


Insulin Human Lispro (Humalog)  15 units SC TIDAC [facility]


   Last Admin: 01/12/18 12:23 Dose:  15 units


Lisinopril (Zestril)  10 mg PO DAILY [facility]


   Last Admin: 01/12/18 09:11 Dose:  10 mg


Lorazepam (Ativan)  0.5 mg PO HS PRN


   PRN Reason: Insomnia


   Stop: 01/24/18 02:29


Lorazepam (Ativan)  0.5 mg PO Q6 PRN


   PRN Reason: Anixety/Agitation


   Stop: 01/24/18 02:29


   Last Admin: 01/10/18 08:36 Dose:  0.5 mg


Magnesium Hydroxide (Milk Of Magnesia)  30 ml PO HS PRN


   PRN Reason: Constipation


Mirtazapine (Remeron)  15 mg PO HS [facility]


   Last Admin: 01/11/18 21:16 Dose:  15 mg


Multivitamins/Minerals (Therapeutic-M Tab)  1 tab PO DAILY [facility]


Nicotine (Nicoderm Cq)  1 patch TD DAILY [facility]


   Last Admin: 01/12/18 09:10 Dose:  1 patch











Results





- Vital Signs


Recent Vital Signs: 


 Last Vital Signs











Temp  98.1 F   01/12/18 06:00


 


Pulse  75   01/12/18 09:11


 


Resp  18   01/12/18 06:00


 


BP  156/78 H  01/12/18 09:11


 


Pulse Ox  97   01/10/18 01:40














- Labs


Result Diagrams: 


 01/10/18 06:25





 01/10/18 06:25


Labs: 


 Laboratory Results - last 24 hr











  01/11/18 01/11/18 01/11/18





  10:53 15:36 20:12


 


POC Glucose (mg/dL)  176 H  273 H  206 H














  01/12/18 01/12/18





  05:55 11:02


 


POC Glucose (mg/dL)  203 H  221 H

## 2018-01-12 NOTE — PCM.PYCHPN
Psychiatric Progress Note





- Psychiatric Progress Note


Patient seen today, length of contact: Patient evaluated, case discussed with 

team, chart reviewed


Patient Chief Complaint: 


"I'm depressed."


Problems Identified/Issues Discussed: 


Patient reports that he continues to be severely depressed.  He is psychomotor 

retarded w/ low motivation.  Patient reports that prior to admission he had 

poor appetite and significant weight loss.   He continues to be tearful on 

interview.  He denies AH/VH/ paranoia/delusions.   He denies adverse effects to 

medications.  Patient was encouraged to shower daily, get out of bed and 

participate in groups.  


Medication Change: No


Medical Record Reviewed: Yes


Consults ordered or reviewed: 


Medicine consult, Physical Therapy consult, Psychology consult





Mental Status Examination





- Cognitive Function


Orientation: Person, Place, Situation, Time


Memory: Intact


Association: WNL


Fund of Knowledge: [facility]


Decription of patient's judgement and insights: 


Fair I/J





- Mood


Mood: Depressed





- Affect


Affect: Depressed





- Speech


Speech: Appropriate





- Formal Thought Process


Formal Thought Process: No Impairment


Psychotic Thoughts and Behaviors: 


No AH/VH/paranoia/delusions





- Suicidal Ideation


Suicidal Ideation: No





- Homicidal Ideation


Homicidal Ideation: No





Goal/Treatment Plan





- Goal/Treatment Plan


Need for Continued Stay: Remain at risks for inpatient hospitalization, Severe 

depression anxiety, Discharge may exacerbated symptoms, Severe functional 

impairment


Progress Toward Problem(s) and Goals/Treatment Plan: 


Major Depressive Disorder, Severe; patient needs continued hospitalization for 

treatment and safety





-Individual and group therapy


-Continue Remeron 15 mg PO HS


-Medicine consult


-Physical therapy consult


-Psychology consult


-Disposition planning


-Obtain collateral history from patient's son


-Nicotine patch


Estimated Date of D/C: 01/19/18





- Smoking Cessation


Smoking Cessation Initiated: Yes

## 2018-01-13 RX ADMIN — INSULIN LISPRO SCH: 100 INJECTION, SOLUTION INTRAVENOUS; SUBCUTANEOUS at 16:39

## 2018-01-13 RX ADMIN — INSULIN LISPRO SCH: 100 INJECTION, SOLUTION INTRAVENOUS; SUBCUTANEOUS at 16:38

## 2018-01-13 RX ADMIN — INSULIN LISPRO SCH: 100 INJECTION, SOLUTION INTRAVENOUS; SUBCUTANEOUS at 21:07

## 2018-01-13 RX ADMIN — INSULIN LISPRO SCH U: 100 INJECTION, SOLUTION INTRAVENOUS; SUBCUTANEOUS at 08:17

## 2018-01-13 RX ADMIN — INSULIN LISPRO SCH: 100 INJECTION, SOLUTION INTRAVENOUS; SUBCUTANEOUS at 12:35

## 2018-01-13 RX ADMIN — INSULIN LISPRO SCH UNITS: 100 INJECTION, SOLUTION INTRAVENOUS; SUBCUTANEOUS at 08:18

## 2018-01-13 RX ADMIN — INSULIN DETEMIR SCH UNITS: 100 INJECTION, SOLUTION SUBCUTANEOUS at 21:03

## 2018-01-13 RX ADMIN — Medication SCH TAB: at 08:20

## 2018-01-13 RX ADMIN — INSULIN LISPRO SCH UNITS: 100 INJECTION, SOLUTION INTRAVENOUS; SUBCUTANEOUS at 12:36

## 2018-01-13 NOTE — PCM.PYCHPN
Psychiatric Progress Note





- Psychiatric Progress Note


Patient seen today, length of contact: Patient evaluated, case discussed with 

team, chart reviewed


Patient Chief Complaint: 





I am feeling a little better today


Problems Identified/Issues Discussed: 





pt seen in bed , calm cooperative, reported mood a little better, denied any 

current suicidal or homicidal ideations denied perceptual disturbances


DSM 5 Symptoms Update: 





major depression


Medication Change: No


Medical Record Reviewed: Yes





Mental Status Examination





- Cognitive Function


Orientation: Person, Place, Situation, Time


Memory: Intact


Association: WNL


Fund of Knowledge: [facility]





- Mood


Mood: Depressed





- Affect


Affect: Depressed





- Speech


Speech: Appropriate





- Formal Thought Process


Formal Thought Process: No Impairment





- Suicidal Ideation


Suicidal Ideation: No





- Homicidal Ideation


Homicidal Ideation: No





Goal/Treatment Plan





- Goal/Treatment Plan


Need for Continued Stay: Remain at risks for inpatient hospitalization, Severe 

depression anxiety, Discharge may exacerbated symptoms, Severe functional 

impairment


Progress Toward Problem(s) and Goals/Treatment Plan: 





continue current managment


supportive and group therapy


Estimated Date of D/C: 01/19/18

## 2018-01-14 RX ADMIN — INSULIN LISPRO SCH U: 100 INJECTION, SOLUTION INTRAVENOUS; SUBCUTANEOUS at 08:14

## 2018-01-14 RX ADMIN — INSULIN LISPRO SCH U: 100 INJECTION, SOLUTION INTRAVENOUS; SUBCUTANEOUS at 12:04

## 2018-01-14 RX ADMIN — INSULIN DETEMIR SCH UNITS: 100 INJECTION, SOLUTION SUBCUTANEOUS at 21:11

## 2018-01-14 RX ADMIN — INSULIN LISPRO SCH UNITS: 100 INJECTION, SOLUTION INTRAVENOUS; SUBCUTANEOUS at 08:15

## 2018-01-14 RX ADMIN — INSULIN LISPRO SCH UNITS: 100 INJECTION, SOLUTION INTRAVENOUS; SUBCUTANEOUS at 17:03

## 2018-01-14 RX ADMIN — INSULIN LISPRO SCH UNITS: 100 INJECTION, SOLUTION INTRAVENOUS; SUBCUTANEOUS at 12:04

## 2018-01-14 RX ADMIN — INSULIN LISPRO SCH: 100 INJECTION, SOLUTION INTRAVENOUS; SUBCUTANEOUS at 21:11

## 2018-01-14 RX ADMIN — Medication SCH TAB: at 08:16

## 2018-01-14 RX ADMIN — INSULIN LISPRO SCH: 100 INJECTION, SOLUTION INTRAVENOUS; SUBCUTANEOUS at 17:00

## 2018-01-14 NOTE — PCM.PYCHPN
Psychiatric Progress Note





- Psychiatric Progress Note


Patient seen today, length of contact: Patient evaluated, case discussed with 

team, chart reviewed


Patient Chief Complaint: 





I am feeling better


Problems Identified/Issues Discussed: 





pt seen in bed , calm cooperative, reported better mood, brighter affect


no reported side effects of medications


denied any current suicidal or homicidal ideations denied perceptual 

disturbances


DSM 5 Symptoms Update: 





major depression


Medication Change: No


Medical Record Reviewed: Yes





Mental Status Examination





- Cognitive Function


Orientation: Person, Place, Situation, Time


Memory: Intact


Association: WNL


Fund of Knowledge: [facility]





- Mood


Mood: Depressed





- Affect


Affect: Depressed





- Speech


Speech: Appropriate





- Formal Thought Process


Formal Thought Process: No Impairment





- Suicidal Ideation


Suicidal Ideation: No





- Homicidal Ideation


Homicidal Ideation: No





Goal/Treatment Plan





- Goal/Treatment Plan


Need for Continued Stay: Remain at risks for inpatient hospitalization, Severe 

depression anxiety, Discharge may exacerbated symptoms, Severe functional 

impairment


Progress Toward Problem(s) and Goals/Treatment Plan: 





continue current medications


supportive and group therapy


Estimated Date of D/C: 01/19/18

## 2018-01-15 RX ADMIN — INSULIN LISPRO SCH U: 100 INJECTION, SOLUTION INTRAVENOUS; SUBCUTANEOUS at 17:13

## 2018-01-15 RX ADMIN — INSULIN DETEMIR SCH UNITS: 100 INJECTION, SOLUTION SUBCUTANEOUS at 21:20

## 2018-01-15 RX ADMIN — INSULIN LISPRO SCH: 100 INJECTION, SOLUTION INTRAVENOUS; SUBCUTANEOUS at 08:07

## 2018-01-15 RX ADMIN — INSULIN LISPRO SCH U: 100 INJECTION, SOLUTION INTRAVENOUS; SUBCUTANEOUS at 17:14

## 2018-01-15 RX ADMIN — Medication SCH TAB: at 08:14

## 2018-01-15 RX ADMIN — INSULIN LISPRO SCH UNITS: 100 INJECTION, SOLUTION INTRAVENOUS; SUBCUTANEOUS at 08:14

## 2018-01-15 RX ADMIN — INSULIN LISPRO SCH: 100 INJECTION, SOLUTION INTRAVENOUS; SUBCUTANEOUS at 13:09

## 2018-01-15 RX ADMIN — INSULIN LISPRO SCH: 100 INJECTION, SOLUTION INTRAVENOUS; SUBCUTANEOUS at 21:20

## 2018-01-15 NOTE — PCM.PYCHPN
Psychiatric Progress Note





- Psychiatric Progress Note


Patient seen today, length of contact: Patient evaluated, case discussed with 

team, chart reviewed


Patient Chief Complaint: 


"I'm depressed."


Problems Identified/Issues Discussed: 


No significant events over the weekend.  Patient continues to report that he 

feels depressed, but he is more motivated to get up and participate in 

treatment.  He has improved appetite.  No current AH/VH/paranoia/delusions/ SI/

HI.  He denies adverse effects to medications.  


Medication Change: No


Medical Record Reviewed: Yes


Consults ordered or reviewed: 


Medicine consult, Physical Therapy consult





Psychology consult 1/12/18





Pt is a 73 year old male admitted to the geropsych unit and referred to the 

writer for evaluation.  On the DRS, pt scored an overall score of 109. pt 

scored within normal limits on Attention, Construction and Conceptualization 

tasks.  Pt's Initiation and Memory skills fell in the Deficient Range.





Overall  109


Attention  34


Memory  15


Initiation  20


Construction  4


Conceptualization  34





Deficits evident with patient requiring assistance in the future,


thank you for this referral,


Dr. White





Mental Status Examination





- Cognitive Function


Orientation: Person, Place, Situation, Time


Memory: Intact


Association: WNL


Fund of Knowledge: [facility]


Decription of patient's judgement and insights: 


Fair I/J





- Mood


Mood: Depressed





- Affect


Affect: Depressed





- Speech


Speech: Appropriate





- Formal Thought Process


Formal Thought Process: No Impairment


Psychotic Thoughts and Behaviors: 


No AH/VH/paranoia/delusions





- Suicidal Ideation


Suicidal Ideation: No





- Homicidal Ideation


Homicidal Ideation: No





Goal/Treatment Plan





- Goal/Treatment Plan


Need for Continued Stay: Remain at risks for inpatient hospitalization, Severe 

depression anxiety, Discharge may exacerbated symptoms, Severe functional 

impairment


Progress Toward Problem(s) and Goals/Treatment Plan: 


Major Depressive Disorder, Severe; patient needs continued hospitalization for 

treatment and safety





-Individual and group therapy


-Continue Remeron 15 mg PO HS


-Medicine consult


-Physical therapy consult


-Psychology consult


-Disposition planning


-Obtain collateral history from patient's son


-Nicotine patch


Estimated Date of D/C: 01/19/18





- Smoking Cessation


Smoking Cessation Initiated: Yes

## 2018-01-16 RX ADMIN — INSULIN LISPRO SCH U: 100 INJECTION, SOLUTION INTRAVENOUS; SUBCUTANEOUS at 08:17

## 2018-01-16 RX ADMIN — INSULIN LISPRO SCH U: 100 INJECTION, SOLUTION INTRAVENOUS; SUBCUTANEOUS at 11:51

## 2018-01-16 RX ADMIN — INSULIN LISPRO SCH U: 100 INJECTION, SOLUTION INTRAVENOUS; SUBCUTANEOUS at 08:18

## 2018-01-16 RX ADMIN — INSULIN LISPRO SCH U: 100 INJECTION, SOLUTION INTRAVENOUS; SUBCUTANEOUS at 17:36

## 2018-01-16 RX ADMIN — INSULIN LISPRO SCH: 100 INJECTION, SOLUTION INTRAVENOUS; SUBCUTANEOUS at 21:07

## 2018-01-16 RX ADMIN — Medication SCH TAB: at 08:19

## 2018-01-16 RX ADMIN — INSULIN DETEMIR SCH UNITS: 100 INJECTION, SOLUTION SUBCUTANEOUS at 21:08

## 2018-01-16 NOTE — CP.PCM.PN
Subjective





- Date & Time of Evaluation


Date of Evaluation: 01/16/18


Time of Evaluation: 07:06





- Subjective


Subjective: 





pt in no distress/offers no complaints. no f/c, n/v/d. gluocse has been labile. 

no s/s hyper/hypoglycemia at present





Objective





- Vital Signs/Intake and Output


Vital Signs (last 24 hours): 


 











Temp Pulse Resp BP Pulse Ox


 


 98.1 F   90   19   103/69   97 


 


 01/16/18 06:00  01/16/18 06:00  01/16/18 06:00  01/16/18 06:00  01/10/18 01:40











- Medications


Medications: 


 Current Medications





Acetaminophen (Tylenol 325mg Tab)  650 mg PO Q4 PRN


   PRN Reason: Pain, moderate (4-7)


Al Hydrox/Mg Hydrox/Simethicone (Maalox Plus 30 Ml)  30 ml PO Q4 PRN


   PRN Reason: Dyspepsia


Bismuth Subsalicylate (Pepto-Bismol)  524 mg PO Q4 PRN


   PRN Reason: Diarrhea


Famotidine (Pepcid)  20 mg PO Q12 [facility]


   Last Admin: 01/15/18 21:19 Dose:  20 mg


Insulin Detemir (Levemir)  20 units SC HS [facility]


   Last Admin: 01/15/18 21:20 Dose:  20 units


Insulin Human Lispro (Humalog)  0 units SC ACHS [facility]


   PRN Reason: Protocol


   Last Admin: 01/15/18 21:20 Dose:  Not Given


Insulin Human Lispro (Humalog)  10 units SC TIDAC [facility]


   Last Admin: 01/15/18 17:13 Dose:  10 u


Lisinopril (Zestril)  10 mg PO DAILY [facility]


   Last Admin: 01/15/18 08:13 Dose:  10 mg


Magnesium Hydroxide (Milk Of Magnesia)  30 ml PO HS PRN


   PRN Reason: Constipation


Mirtazapine (Remeron)  15 mg PO HS [facility]


   Last Admin: 01/15/18 21:19 Dose:  15 mg


Multivitamins/Minerals (Therapeutic-M Tab)  1 tab PO DAILY [facility]


   Last Admin: 01/15/18 08:14 Dose:  1 tab


Nicotine (Nicoderm Cq)  1 patch TD DAILY [facility]


   Last Admin: 01/15/18 08:13 Dose:  1 patch











- Labs


Labs: 


 





 01/10/18 06:25 





 01/10/18 06:25 











- Constitutional


Appears: Well, Non-toxic, No Acute Distress





- Head Exam


Head Exam: ATRAUMATIC, NORMAL INSPECTION, NORMOCEPHALIC





- Eye Exam


Eye Exam: EOMI, Normal appearance, PERRL


Pupil Exam: NORMAL ACCOMODATION, PERRL





- ENT Exam


ENT Exam: Mucous Membranes Moist, Normal Exam





- Neck Exam


Neck Exam: Full ROM, Normal Inspection.  absent: Lymphadenopathy





- Respiratory Exam


Respiratory Exam: Clear to Ausculation Bilateral, NORMAL BREATHING PATTERN





- Cardiovascular Exam


Cardiovascular Exam: REGULAR RHYTHM, RRR, +S1, +S2.  absent: Murmur





- GI/Abdominal Exam


GI & Abdominal Exam: Soft, Normal Bowel Sounds.  absent: Tenderness





- Extremities Exam


Extremities Exam: Full ROM, Normal Capillary Refill, Normal Inspection.  absent

: Joint Swelling, Pedal Edema





- Back Exam


Back Exam: NORMAL INSPECTION





- Neurological Exam


Neurological Exam: Alert, Awake, CN II-XII Intact, Normal Gait, Oriented x3





- Psychiatric Exam


Psychiatric exam: Normal Affect, Normal Mood





- Skin


Skin Exam: Dry, Intact, Normal Color, Rash, Warm





Assessment and Plan


(1) HTN (hypertension)


Status: Acute   





(2) Depression with suicidal ideation


Status: Acute   





(3) DVT prophylaxis


Status: Acute   





(4) Type 2 diabetes mellitus with hyperglycemia


Status: Acute   





- Assessment and Plan (Free Text)


Assessment: 





(1) HTN (hypertension)


Assessment and Plan: 


cont home meds


Status: Acute   





(2) Depression with suicidal ideation


Assessment and Plan: 


psych meds, interventions, therapies, 


Status: Acute   





(3) DVT prophylaxis


Assessment and Plan: 


ae hose, ambulation


Status: Acute   





(4) Type 2 diabetes mellitus with hyperglycemia


Assessment and Plan: 


fsbg, ss, home meds


diet control


cont to adjust standing isulin. endo prn


Status: Acute

## 2018-01-16 NOTE — PCM.PYCHPN
Psychiatric Progress Note





- Psychiatric Progress Note


Patient seen today, length of contact: Patient evaluated, case discussed with 

team, chart reviewed


Patient Chief Complaint: 


"I'm depressed."


Problems Identified/Issues Discussed: 


Patient continues to report that he feels depressed, but he is more motivated 

to get up and participate in treatment.  He continues to have constricted 

affect.  He has improved appetite and sleep.  No current AH/VH/paranoia/

delusions/ SI/HI.  He denies adverse effects to medications.  


Medication Change: No


Medical Record Reviewed: Yes


Consults ordered or reviewed: 


Medicine consult, Physical Therapy consult





Psychology consult 1/12/18





Pt is a 73 year old male admitted to the geropsych unit and referred to the 

writer for evaluation.  On the DRS, pt scored an overall score of 109. pt 

scored within normal limits on Attention, Construction and Conceptualization 

tasks.  Pt's Initiation and Memory skills fell in the Deficient Range.





Overall  109


Attention  34


Memory  15


Initiation  20


Construction  4


Conceptualization  34





Deficits evident with patient requiring assistance in the future,


thank you for this referral,


Dr. White





Mental Status Examination





- Cognitive Function


Orientation: Person, Place, Situation, Time


Memory: Intact


Association: WNL


Fund of Knowledge: [facility]


Decription of patient's judgement and insights: 


Fair I/J





- Mood


Mood: Depressed





- Affect


Affect: Constricted, Depressed





- Speech


Speech: Appropriate





- Formal Thought Process


Formal Thought Process: No Impairment


Psychotic Thoughts and Behaviors: 


No AH/VH/paranoia/delusions





- Suicidal Ideation


Suicidal Ideation: No





- Homicidal Ideation


Homicidal Ideation: No





Goal/Treatment Plan





- Goal/Treatment Plan


Need for Continued Stay: Remain at risks for inpatient hospitalization, Severe 

depression anxiety, Discharge may exacerbated symptoms, Severe functional 

impairment


Progress Toward Problem(s) and Goals/Treatment Plan: 


Major Depressive Disorder, Severe; patient needs continued hospitalization for 

treatment and safety





-Individual and group therapy


-Continue Remeron 15 mg PO HS


-Medicine consult


-Physical therapy consult


-Psychology consult


-Disposition planning


-Obtain collateral history from patient's son


-Nicotine patch


Estimated Date of D/C: 01/19/18





- Smoking Cessation


Smoking Cessation Initiated: Yes

## 2018-01-17 RX ADMIN — INSULIN LISPRO SCH U: 100 INJECTION, SOLUTION INTRAVENOUS; SUBCUTANEOUS at 07:56

## 2018-01-17 RX ADMIN — INSULIN LISPRO SCH U: 100 INJECTION, SOLUTION INTRAVENOUS; SUBCUTANEOUS at 16:35

## 2018-01-17 RX ADMIN — Medication SCH TAB: at 07:59

## 2018-01-17 RX ADMIN — INSULIN DETEMIR SCH UNITS: 100 INJECTION, SOLUTION SUBCUTANEOUS at 21:09

## 2018-01-17 RX ADMIN — INSULIN LISPRO SCH: 100 INJECTION, SOLUTION INTRAVENOUS; SUBCUTANEOUS at 07:57

## 2018-01-17 RX ADMIN — INSULIN LISPRO SCH U: 100 INJECTION, SOLUTION INTRAVENOUS; SUBCUTANEOUS at 16:36

## 2018-01-17 RX ADMIN — INSULIN LISPRO SCH U: 100 INJECTION, SOLUTION INTRAVENOUS; SUBCUTANEOUS at 12:15

## 2018-01-17 RX ADMIN — INSULIN LISPRO SCH: 100 INJECTION, SOLUTION INTRAVENOUS; SUBCUTANEOUS at 21:11

## 2018-01-17 NOTE — PCM.PYCHPN
Psychiatric Progress Note





- Psychiatric Progress Note


Patient seen today, length of contact: Patient evaluated, case discussed with 

team, chart reviewed


Patient Chief Complaint: 


"I'm depressed."


Problems Identified/Issues Discussed: 


Patient continues to report that he feels depressed.  He continues to have 

constricted affect.  He has to be encouraged to get out of bed, shower and 

participate in groups.  No current AH/VH/paranoia/delusions/ SI/HI.  He denies 

adverse effects to medications.  


Medication Change: Yes (Increase Remeron to 22.5 mg PO HS)


Medical Record Reviewed: Yes


Consults ordered or reviewed: 


Medicine consult, Physical Therapy consult





Psychology consult 1/12/18





Pt is a 73 year old male admitted to the geropsych unit and referred to the 

writer for evaluation.  On the DRS, pt scored an overall score of 109. pt 

scored within normal limits on Attention, Construction and Conceptualization 

tasks.  Pt's Initiation and Memory skills fell in the Deficient Range.





Overall  109


Attention  34


Memory  15


Initiation  20


Construction  4


Conceptualization  34





Deficits evident with patient requiring assistance in the future,


thank you for this referral,


Dr. White





Mental Status Examination





- Cognitive Function


Orientation: Person, Place, Situation, Time


Memory: Intact


Association: WNL


Fund of Knowledge: [facility]


Decription of patient's judgement and insights: 


Fair I/J





- Mood


Mood: Depressed





- Affect


Affect: Constricted, Depressed





- Speech


Speech: Appropriate





- Formal Thought Process


Formal Thought Process: No Impairment


Psychotic Thoughts and Behaviors: 


No AH/VH/paranoia/delusions





- Suicidal Ideation


Suicidal Ideation: No





- Homicidal Ideation


Homicidal Ideation: No





Goal/Treatment Plan





- Goal/Treatment Plan


Need for Continued Stay: Remain at risks for inpatient hospitalization, Severe 

depression anxiety, Discharge may exacerbated symptoms, Severe functional 

impairment


Progress Toward Problem(s) and Goals/Treatment Plan: 


Major Depressive Disorder, Severe; patient needs continued hospitalization for 

treatment and safety





-Individual and group therapy


-Increase Remeron to 22.5 mg PO HS


-Medicine consult


-Physical therapy consult


-Psychology consult


-Disposition planning


-Collateral history obtained from patient's son


-Nicotine patch


Estimated Date of D/C: 01/22/18





- Smoking Cessation


Smoking Cessation Initiated: Yes

## 2018-01-18 RX ADMIN — INSULIN LISPRO SCH U: 100 INJECTION, SOLUTION INTRAVENOUS; SUBCUTANEOUS at 12:48

## 2018-01-18 RX ADMIN — INSULIN LISPRO SCH U: 100 INJECTION, SOLUTION INTRAVENOUS; SUBCUTANEOUS at 09:04

## 2018-01-18 RX ADMIN — INSULIN LISPRO SCH U: 100 INJECTION, SOLUTION INTRAVENOUS; SUBCUTANEOUS at 17:57

## 2018-01-18 RX ADMIN — INSULIN LISPRO SCH U: 100 INJECTION, SOLUTION INTRAVENOUS; SUBCUTANEOUS at 12:49

## 2018-01-18 RX ADMIN — INSULIN LISPRO SCH U: 100 INJECTION, SOLUTION INTRAVENOUS; SUBCUTANEOUS at 17:55

## 2018-01-18 RX ADMIN — INSULIN LISPRO SCH: 100 INJECTION, SOLUTION INTRAVENOUS; SUBCUTANEOUS at 21:16

## 2018-01-18 RX ADMIN — Medication SCH TAB: at 09:06

## 2018-01-18 RX ADMIN — INSULIN DETEMIR SCH UNITS: 100 INJECTION, SOLUTION SUBCUTANEOUS at 21:14

## 2018-01-18 NOTE — PCM.PYCHPN
Psychiatric Progress Note





- Psychiatric Progress Note


Patient seen today, length of contact: Patient evaluated, case discussed with 

team, chart reviewed


Patient Chief Complaint: 


"I'm depressed."


Problems Identified/Issues Discussed: 


Patient continues to reports feelings of depression, but is less tearful and 

has a brighter affect.  No current AH/VH/paranoia/delusions/ SI/HI.  He denies 

adverse effects to medications.  


Medication Change: No


Medical Record Reviewed: Yes


Consults ordered or reviewed: 


Medicine consult, Physical Therapy consult





Psychology consult 1/12/18





Pt is a 73 year old male admitted to the geropsych unit and referred to the 

writer for evaluation.  On the DRS, pt scored an overall score of 109. pt 

scored within normal limits on Attention, Construction and Conceptualization 

tasks.  Pt's Initiation and Memory skills fell in the Deficient Range.





Overall  109


Attention  34


Memory  15


Initiation  20


Construction  4


Conceptualization  34





Deficits evident with patient requiring assistance in the future,


thank you for this referral,


Dr. White





Mental Status Examination





- Cognitive Function


Orientation: Person, Place, Situation, Time


Memory: Intact


Association: WNL


Fund of Knowledge: [facility]


Decription of patient's judgement and insights: 


Fair I/J





- Mood


Mood: Depressed





- Affect


Affect: Depressed





- Speech


Speech: Appropriate





- Formal Thought Process


Formal Thought Process: No Impairment


Psychotic Thoughts and Behaviors: 


No AH/VH/paranoia/delusions





- Suicidal Ideation


Suicidal Ideation: No





- Homicidal Ideation


Homicidal Ideation: No





Goal/Treatment Plan





- Goal/Treatment Plan


Need for Continued Stay: Severe depression anxiety, Discharge may exacerbated 

symptoms


Progress Toward Problem(s) and Goals/Treatment Plan: 


Major Depressive Disorder, Severe; patient needs continued hospitalization for 

treatment and safety





-Individual and group therapy


-Continue Remeron 22.5 mg PO HS


-Medicine consult


-Physical therapy consult


-Psychology consult


-Disposition planning


-Collateral history obtained from patient's son; will start disposition 

planning w/ son


-Nicotine patch


Estimated Date of D/C: 01/22/18





- Smoking Cessation


Smoking Cessation Initiated: Yes

## 2018-01-19 RX ADMIN — INSULIN DETEMIR SCH UNITS: 100 INJECTION, SOLUTION SUBCUTANEOUS at 21:15

## 2018-01-19 RX ADMIN — INSULIN LISPRO SCH U: 100 INJECTION, SOLUTION INTRAVENOUS; SUBCUTANEOUS at 12:41

## 2018-01-19 RX ADMIN — INSULIN LISPRO SCH U: 100 INJECTION, SOLUTION INTRAVENOUS; SUBCUTANEOUS at 12:43

## 2018-01-19 RX ADMIN — Medication SCH TAB: at 08:24

## 2018-01-19 RX ADMIN — INSULIN LISPRO SCH U: 100 INJECTION, SOLUTION INTRAVENOUS; SUBCUTANEOUS at 12:45

## 2018-01-19 RX ADMIN — INSULIN LISPRO SCH U: 100 INJECTION, SOLUTION INTRAVENOUS; SUBCUTANEOUS at 17:20

## 2018-01-19 RX ADMIN — INSULIN LISPRO SCH U: 100 INJECTION, SOLUTION INTRAVENOUS; SUBCUTANEOUS at 17:19

## 2018-01-19 RX ADMIN — INSULIN LISPRO SCH: 100 INJECTION, SOLUTION INTRAVENOUS; SUBCUTANEOUS at 21:17

## 2018-01-19 NOTE — PCM.BM
Treatment Plan Problems





- Problems identified on initial assessmt


  ** Feelings of Worthlessness


Date Initiated: 01/10/18


Time Initiated: 02:21


Assessment reference: NA


Status: Active





  ** Hopelessness/Helplessnes


Date Initiated: 01/10/18


Time Initiated: 02:22


Assessment reference: NA


Status: Active





Treatment assets and liabiliti


Patient Assests: cooperative, negotiates basic needs


Patient Liabilities: relationship conflicts, substance abuse





- Milieu Protocol


Maintain good personal hygiene: daily Encourage regular showers, daily Remind 

patient to perform daily oral care, daily Assist patient to perform ADL's


Conduct patient checks and document Observation sheet: 1:1


Maintain personal safety: every shift Educate patient to report safety concerns 

to staff, every shift Monitor environment for contraband/sharps


Medication safety: Monitor for expected outcome, potential side effects: every 

shift, Assess barriers to learning: every shift, Assess readiness for 

medication education: every shift


Milieu Narrative: 


Major Depressive Disorder, Severe; patient needs continued hospitalization for 

treatment and safety





-Individual and group therapy


-Increase Remeron to 30 mg PO HS 


-Medicine consult


-Physical therapy consult


-Psychology consult


-Disposition planning


-Collateral history obtained from patient's son; will start disposition 

planning w/ son


-Nicotine patch





Family Contact


Family contact: Patient agrees to contact, Family has been contacted by patient

, Telephone contact initiated by staff


Family contact name: Keshawn - son


Family contacted how many times per week?: 2


Family contact comment: [phone].  Please reefr to  progress note for 

information





- Goals for Treatment


Patient goals for treatment: Pt to be encouraged to attend activity and 

clinical groups 3-5x per week to identify at least 2 contributing factors to 

depression and suicide attempt. Psycho-education to be provided to patient/

family regarding benefits of medications and treatment adherence. Pt to be 

encouraged to participate in group milieu to develop effective coping skills to 

reduce depression and free of suicide ideation. Coordinate discharge resource 

needs by providing referral for psychiatric treatment follow up in the 

community.





Discharge/Continuing Care





- Education Needs


Education Needs: Family Medication, Family Diagnosis/Disease Process, Family 

Coping Skills, Family Placement options, Family Community resources, Family 

Activities of Daily Living, Family Nutrition, Family Health Practices/Safety, 

Family Personal Hygiene/Grooming, Family Aftercare Safety Plan, Patient 

Medication, Patient Diagnosis/Disease Process, Patient Coping Skills, Patient 

Placement options, Patient Community resources, Patient Activities of Daily 

Living, Patient Nutrition, Patient Uses of Medical Equipment, Patient Health 

Practices/Safety, Patient Personal Hygiene/Grooming, Patient Aftercare Safety 

Plan





- Discharge


Discharge Criteria: Tolerates medication w/o severe side effects, Free of 

Suicidal thoughts, Normal sleep pattern, Ability to care for self, Reduction of 

target symptoms


Discharge to:: Home, With Family





- Additional Comments





01/12/18 14:26


Pt seen and discussed in team meeting. Reason for hospitalization reviewed and 

discussed. Pt reported feeling depressed, worthless, helpless, lack of 

initiation and motivation and unable to stay focus. Pt reported feeling 

depressed following his spouse death by suicide. Reportedly, pt's spouse 

overdosed on Xanax in July 2017. Pt reported significant weight loss. Pt's 

social and medical issues reviewed and discussed. Pt's medications reviewed and 

discussed. Tx plan reviewed and pt is agreeable. SW to continue to follow case.

   





- Treatment Team Participation


Patient/Family/SO Statement: 


Major Depressive Disorder, Severe; patient needs continued hospitalization for 

treatment and safety





-Individual and group therapy


-Increase Remeron to 30 mg PO HS 


-Medicine consult


-Physical therapy consult


-Psychology consult


-Disposition planning


-Collateral history obtained from patient's son; will start disposition 

planning w/ son


-Nicotine patch


Discussed with Family/SO: No (Discussed via telephone)


Was Patient/Family/SO present at Treatment Team Meeting: Yes





Treatment Plan Review





- Problem


  ** Feelings of Worthlessness


Date Initiated: 01/10/18


Time Initiated: 02:21


Progress toward outcomes: improved





  ** Hopelessness/Helplessnes


Date Initiated: 01/10/18


Time Initiated: 02:22


Progress toward outcomes: improved (Pt reported feeling "a  little better." Pt 

denied SI and HI. Pt reported feeling less lonely because "i'm here with you 

guys." Pt presents with brighter affect. Pt is less isolate and withdrawn. Pt 

actively participating in unit activities and interacting with peers.)





- Discharge / Continuing Care


Discharge to:: Home


Behavioral Health Services: Outpatient therapy, Home health care, Adult day care


Health Needs: Follow up care/test, Doctor appointments, Nutritional, Medications

/Rx, Recreational/Social

## 2018-01-20 RX ADMIN — Medication SCH TAB: at 08:51

## 2018-01-20 RX ADMIN — INSULIN LISPRO SCH: 100 INJECTION, SOLUTION INTRAVENOUS; SUBCUTANEOUS at 12:50

## 2018-01-20 RX ADMIN — INSULIN DETEMIR SCH UNITS: 100 INJECTION, SOLUTION SUBCUTANEOUS at 21:27

## 2018-01-20 RX ADMIN — INSULIN LISPRO SCH U: 100 INJECTION, SOLUTION INTRAVENOUS; SUBCUTANEOUS at 08:52

## 2018-01-20 RX ADMIN — INSULIN LISPRO SCH: 100 INJECTION, SOLUTION INTRAVENOUS; SUBCUTANEOUS at 21:26

## 2018-01-20 RX ADMIN — INSULIN LISPRO SCH U: 100 INJECTION, SOLUTION INTRAVENOUS; SUBCUTANEOUS at 17:47

## 2018-01-20 RX ADMIN — INSULIN LISPRO SCH: 100 INJECTION, SOLUTION INTRAVENOUS; SUBCUTANEOUS at 12:51

## 2018-01-20 NOTE — PCM.PYCHPN
Psychiatric Progress Note





- Psychiatric Progress Note


Patient seen today, length of contact: Patient evaluated, case discussed with 

team, chart reviewed


Patient Chief Complaint: 





pt has been less depressed and and less anxious and denies side effects to meds 

.


Medication Change: Yes (Increase Remeron to 30 mg PO HS)


Medical Record Reviewed: Yes





Mental Status Examination





- Cognitive Function


Orientation: Person, Place, Situation, Time


Memory: Impaired


Association: WNL


Fund of Knowledge: [facility]





- Mood


Mood: Depressed





- Affect


Affect: Depressed





- Speech


Speech: Appropriate





- Formal Thought Process


Formal Thought Process: No Impairment





- Suicidal Ideation


Suicidal Ideation: No





- Homicidal Ideation


Homicidal Ideation: No





Goal/Treatment Plan





- Goal/Treatment Plan


Need for Continued Stay: Severe depression anxiety, Discharge may exacerbated 

symptoms


Progress Toward Problem(s) and Goals/Treatment Plan: 





Will continue to titrate meds as needed to stabilize the mood and engage pt in 

therapy.


Disposition plans as per dr cesar


Estimated Date of D/C: 01/22/18

## 2018-01-21 RX ADMIN — INSULIN LISPRO SCH U: 100 INJECTION, SOLUTION INTRAVENOUS; SUBCUTANEOUS at 08:11

## 2018-01-21 RX ADMIN — INSULIN LISPRO SCH U: 100 INJECTION, SOLUTION INTRAVENOUS; SUBCUTANEOUS at 12:54

## 2018-01-21 RX ADMIN — INSULIN LISPRO SCH U: 100 INJECTION, SOLUTION INTRAVENOUS; SUBCUTANEOUS at 08:10

## 2018-01-21 RX ADMIN — INSULIN LISPRO SCH: 100 INJECTION, SOLUTION INTRAVENOUS; SUBCUTANEOUS at 17:53

## 2018-01-21 RX ADMIN — Medication SCH TAB: at 08:09

## 2018-01-21 RX ADMIN — INSULIN LISPRO SCH: 100 INJECTION, SOLUTION INTRAVENOUS; SUBCUTANEOUS at 12:55

## 2018-01-21 RX ADMIN — INSULIN LISPRO SCH: 100 INJECTION, SOLUTION INTRAVENOUS; SUBCUTANEOUS at 21:15

## 2018-01-21 RX ADMIN — INSULIN LISPRO SCH: 100 INJECTION, SOLUTION INTRAVENOUS; SUBCUTANEOUS at 17:52

## 2018-01-21 RX ADMIN — INSULIN DETEMIR SCH UNITS: 100 INJECTION, SOLUTION SUBCUTANEOUS at 21:16

## 2018-01-21 NOTE — PCM.PYCHPN
Psychiatric Progress Note





- Psychiatric Progress Note


Patient seen today, length of contact: Patient evaluated, case discussed with 

team, chart reviewed


Patient Chief Complaint: 





pt has been less depressed and and less anxious and denies side effects to meds 

.


Medication Change: No


Medical Record Reviewed: Yes





Mental Status Examination





- Cognitive Function


Orientation: Person, Place, Situation, Time


Memory: Impaired


Association: WNL


Fund of Knowledge: [facility]





- Mood


Mood: Depressed





- Affect


Affect: Depressed





- Speech


Speech: Appropriate





- Formal Thought Process


Formal Thought Process: No Impairment





- Suicidal Ideation


Suicidal Ideation: No





- Homicidal Ideation


Homicidal Ideation: No





Goal/Treatment Plan





- Goal/Treatment Plan


Need for Continued Stay: Severe depression anxiety, Discharge may exacerbated 

symptoms


Progress Toward Problem(s) and Goals/Treatment Plan: 





will continue to titrate meds and engage pt in therapy and groups .


disposition plans as per dr cesar


Estimated Date of D/C: 01/22/18

## 2018-01-22 VITALS
SYSTOLIC BLOOD PRESSURE: 139 MMHG | DIASTOLIC BLOOD PRESSURE: 96 MMHG | TEMPERATURE: 97.9 F | HEART RATE: 83 BPM | RESPIRATION RATE: 18 BRPM

## 2018-01-22 RX ADMIN — INSULIN LISPRO SCH U: 100 INJECTION, SOLUTION INTRAVENOUS; SUBCUTANEOUS at 08:34

## 2018-01-22 RX ADMIN — INSULIN LISPRO SCH: 100 INJECTION, SOLUTION INTRAVENOUS; SUBCUTANEOUS at 12:24

## 2018-01-22 RX ADMIN — INSULIN LISPRO SCH U: 100 INJECTION, SOLUTION INTRAVENOUS; SUBCUTANEOUS at 12:27

## 2018-01-22 RX ADMIN — Medication SCH TAB: at 08:31

## 2018-01-22 RX ADMIN — INSULIN LISPRO SCH U: 100 INJECTION, SOLUTION INTRAVENOUS; SUBCUTANEOUS at 08:33

## 2018-01-22 NOTE — PCM.[facility]
Mental Status Examination





- Mental Status Examination


Orientation: Person, Place, Situation, Time


Memory: Impaired (Chronic impairment due to mild dementia)


Mood: Neutral


Affect: Broad


Speech: Appropriate


Attention: WNL


Association: WNL


Fund of Knowledge: WNL


Formal Thought Process: No Impairment


Description of patient's judgement and insight: 


Fair I/J


Psychotic Thoughts and Behaviors: 


No AH/VH/paranoia/delusions


Suicidal Ideation: No


Current Homicidal Ideation?: No





Discharge Summary





- Discharge Note


Reason for Hospitalization: 


As per initial HPI note:


Was at [facility] 2nd to being brought there for suicidal ideations to 

hang self since wife was reportedly  approx. 4 months ago 2nd "overdose 

pills". pt reports that he no longer wants to live,  for more than 37 

years. denies previous psychiatric treatment but report indicates pt may have 

used cocaine in the past. denies previous suicide attempts. denies previous 

inpt psychiatric treatment. reports smokes cigarettes 10 per day.     


Laboratory Data: 





 Abnormal Lab Results











  18





  11:09 15:38 19:36


 


POC Glucose (mg/dL)  149 H  116 H  243 H














  18





  05:33


 


POC Glucose (mg/dL)  221 H











Consultations:: List each consultation separately and include:  1. Reason for 

request.  2. Findings.  3. Follow-up


Consultations: 


Medicine consult, Physical Therapy consult





Psychology consult 18





Pt is a 73 year old male admitted to the geropsych unit and referred to the 

writer for evaluation.  On the DRS, pt scored an overall score of 109. pt 

scored within normal limits on Attention, Construction and Conceptualization 

tasks.  Pt's Initiation and Memory skills fell in the Deficient Range.





Overall  109


Attention  34


Memory  15


Initiation  20


Construction  4


Conceptualization  34





Deficits evident with patient requiring assistance in the future,


thank you for this referral,


Dr. White


Summary of Hospital Course include:: 1. Description of specific treatment plan 

utilized for patients during their course of treatmen.  2. Summarize the time-

course for resolution of acute symptoms and/or regressed behaviors.  3. 

Describe issues identified and worked on during hospitalization.  4. Describe 

medication utilized.  5. Describe medical problems identified and treated.  6. 

Reassessment of suicide risk


Summary of Hospital Course: 


Patient was admitted to the geriatric psychiatry unit.  Individual and group 

therapy were provided.  Patient was stabilized on Remeron 30 mg PO HS.  

Psychoeducation provided to the patient and his son.  Patient is currently 

psychiatrically stable for discharge with outpatient follow-up.





- Diagnosis


(1) Major depressive disorder


Current Visit: Yes   Status: Chronic   





- Final Diagnosis (DSM 5)


Condition upon Discharge: STABLE


DSM 5: 


Major Depressive Disorder


Disposition: HOME/ ROUTINE


Follow-up Treatment Plan: 


Major Depressive Disorder, Severe; patient is psychiatrically stable for 

discharge with outpatient follow-up.





-Individual and group therapy


-Continue Remeron 30 mg PO HS 


-Medicine consult


-Physical therapy consult


-Psychology consult


-Disposition planning- discharge to home with outpatient follow-up


-Collateral history obtained from patient's son


-Nicotine patch


Prescriptions/Medication Reconciliation: 


Mirtazapine [Remeron] 30 mg PO HS #30 tab


Nicotine [Nicotine Patch] 7 mg TD DAILY #30 patch.[id number]





- Smoking Cessation


Smoking Cessation Medication prescribed: Yes





- Antipsychotic Medications


Pt discharged on 2 or more routine antipsychotic medications: No